# Patient Record
Sex: MALE | Race: BLACK OR AFRICAN AMERICAN | Employment: OTHER | URBAN - METROPOLITAN AREA
[De-identification: names, ages, dates, MRNs, and addresses within clinical notes are randomized per-mention and may not be internally consistent; named-entity substitution may affect disease eponyms.]

---

## 2022-09-24 ENCOUNTER — APPOINTMENT (OUTPATIENT)
Dept: GENERAL RADIOLOGY | Age: 62
DRG: 085 | End: 2022-09-24
Payer: MEDICARE

## 2022-09-24 ENCOUNTER — APPOINTMENT (OUTPATIENT)
Dept: CT IMAGING | Age: 62
DRG: 085 | End: 2022-09-24
Payer: MEDICARE

## 2022-09-24 ENCOUNTER — HOSPITAL ENCOUNTER (INPATIENT)
Age: 62
LOS: 4 days | Discharge: HOME | DRG: 085 | End: 2022-09-28
Attending: EMERGENCY MEDICINE | Admitting: INTERNAL MEDICINE
Payer: MEDICARE

## 2022-09-24 ENCOUNTER — APPOINTMENT (OUTPATIENT)
Dept: ULTRASOUND IMAGING | Age: 62
DRG: 085 | End: 2022-09-24
Payer: MEDICARE

## 2022-09-24 ENCOUNTER — HOSPITAL ENCOUNTER (EMERGENCY)
Age: 62
Discharge: ANOTHER HOSPITAL | DRG: 085 | End: 2022-09-24
Attending: EMERGENCY MEDICINE
Payer: MEDICARE

## 2022-09-24 VITALS
TEMPERATURE: 98.7 F | SYSTOLIC BLOOD PRESSURE: 161 MMHG | DIASTOLIC BLOOD PRESSURE: 107 MMHG | OXYGEN SATURATION: 100 % | RESPIRATION RATE: 16 BRPM | WEIGHT: 120 LBS | BODY MASS INDEX: 14.92 KG/M2 | HEART RATE: 104 BPM | HEIGHT: 75 IN

## 2022-09-24 DIAGNOSIS — N18.9 ACUTE KIDNEY INJURY SUPERIMPOSED ON CKD (HCC): ICD-10-CM
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
## 2022-09-24 DIAGNOSIS — N17.9 ACUTE KIDNEY INJURY SUPERIMPOSED ON CKD (HCC): ICD-10-CM

## 2022-09-24 DIAGNOSIS — S02.85XA CLOSED FRACTURE OF ORBITAL WALL, INITIAL ENCOUNTER (HCC): Primary | ICD-10-CM

## 2022-09-24 PROBLEM — N13.4 HYDROURETER, LEFT: Status: ACTIVE | Noted: 2022-09-24

## 2022-09-24 PROBLEM — G93.41 ACUTE METABOLIC ENCEPHALOPATHY: Status: ACTIVE | Noted: 2022-09-24

## 2022-09-24 PROBLEM — Z85.528 HISTORY OF RENAL CELL CANCER: Status: ACTIVE | Noted: 2022-09-24

## 2022-09-24 PROBLEM — F19.10 POLYSUBSTANCE ABUSE (HCC): Status: ACTIVE | Noted: 2022-09-24

## 2022-09-24 PROBLEM — E87.1 HYPONATREMIA: Status: ACTIVE | Noted: 2022-09-24

## 2022-09-24 PROBLEM — N13.30 HYDRONEPHROSIS: Status: ACTIVE | Noted: 2022-09-24

## 2022-09-24 PROBLEM — S02.40EA CLOSED FRACTURE OF RIGHT ZYGOMATIC ARCH (HCC): Status: ACTIVE | Noted: 2022-09-24

## 2022-09-24 PROBLEM — I16.1 HYPERTENSIVE EMERGENCY: Status: ACTIVE | Noted: 2022-09-24

## 2022-09-24 PROBLEM — Z90.5 S/P NEPHRECTOMY: Status: ACTIVE | Noted: 2022-09-24

## 2022-09-24 LAB
ABSOLUTE EOS #: 0.09 K/UL (ref 0–0.44)
ABSOLUTE EOS #: <0.03 K/UL (ref 0–0.44)
ABSOLUTE IMMATURE GRANULOCYTE: 0.03 K/UL (ref 0–0.3)
ABSOLUTE IMMATURE GRANULOCYTE: 0.05 K/UL (ref 0–0.3)
ABSOLUTE LYMPH #: 1.04 K/UL (ref 1.1–3.7)
ABSOLUTE LYMPH #: 1.16 K/UL (ref 1.1–3.7)
ABSOLUTE MONO #: 0.72 K/UL (ref 0.1–1.2)
ABSOLUTE MONO #: 1.12 K/UL (ref 0.1–1.2)
ACETAMINOPHEN LEVEL: <5 UG/ML (ref 10–30)
ALBUMIN SERPL-MCNC: 4.5 G/DL (ref 3.5–5.2)
ALBUMIN SERPL-MCNC: 4.5 G/DL (ref 3.5–5.2)
ALBUMIN/GLOBULIN RATIO: 1.6 (ref 1–2.5)
ALP BLD-CCNC: 66 U/L (ref 40–129)
ALP BLD-CCNC: 67 U/L (ref 40–129)
ALT SERPL-CCNC: 11 U/L (ref 5–41)
ALT SERPL-CCNC: 15 U/L (ref 5–41)
AMPHETAMINE SCREEN URINE: POSITIVE
ANION GAP SERPL CALCULATED.3IONS-SCNC: 13 MMOL/L (ref 9–17)
ANION GAP SERPL CALCULATED.3IONS-SCNC: 14 MMOL/L (ref 9–17)
ANION GAP SERPL CALCULATED.3IONS-SCNC: 17 MMOL/L (ref 9–17)
AST SERPL-CCNC: 27 U/L
AST SERPL-CCNC: 32 U/L
BARBITURATE SCREEN URINE: NEGATIVE
BASOPHILS # BLD: 0 % (ref 0–2)
BASOPHILS # BLD: 0 % (ref 0–2)
BASOPHILS ABSOLUTE: 0.03 K/UL (ref 0–0.2)
BASOPHILS ABSOLUTE: 0.03 K/UL (ref 0–0.2)
BENZODIAZEPINE SCREEN, URINE: NEGATIVE
BILIRUB SERPL-MCNC: 0.3 MG/DL (ref 0.3–1.2)
BILIRUB SERPL-MCNC: 0.7 MG/DL (ref 0.3–1.2)
BILIRUBIN URINE: NEGATIVE
BUN BLDV-MCNC: 30 MG/DL (ref 8–23)
BUN BLDV-MCNC: 30 MG/DL (ref 8–23)
BUN BLDV-MCNC: 33 MG/DL (ref 8–23)
BUN/CREAT BLD: 10 (ref 9–20)
CALCIUM SERPL-MCNC: 9.2 MG/DL (ref 8.6–10.4)
CALCIUM SERPL-MCNC: 9.3 MG/DL (ref 8.6–10.4)
CALCIUM SERPL-MCNC: 9.6 MG/DL (ref 8.6–10.4)
CANNABINOID SCREEN URINE: POSITIVE
CHLORIDE BLD-SCNC: 103 MMOL/L (ref 98–107)
CHLORIDE BLD-SCNC: 94 MMOL/L (ref 98–107)
CHLORIDE BLD-SCNC: 97 MMOL/L (ref 98–107)
CO2: 19 MMOL/L (ref 20–31)
CO2: 21 MMOL/L (ref 20–31)
CO2: 22 MMOL/L (ref 20–31)
COCAINE METABOLITE, URINE: NEGATIVE
COLOR: YELLOW
CREAT SERPL-MCNC: 2.71 MG/DL (ref 0.7–1.2)
CREAT SERPL-MCNC: 2.73 MG/DL (ref 0.7–1.2)
CREAT SERPL-MCNC: 3.18 MG/DL (ref 0.7–1.2)
CREATININE URINE: 58.3 MG/DL (ref 39–259)
EOSINOPHILS RELATIVE PERCENT: 0 % (ref 1–4)
EOSINOPHILS RELATIVE PERCENT: 1 % (ref 1–4)
EPITHELIAL CELLS UA: NORMAL /HPF (ref 0–5)
ETHANOL PERCENT: 0.13 %
ETHANOL PERCENT: <0.01 %
ETHANOL: 132 MG/DL
ETHANOL: <10 MG/DL
FENTANYL URINE: NEGATIVE
GFR AFRICAN AMERICAN: 24 ML/MIN
GFR AFRICAN AMERICAN: 29 ML/MIN
GFR AFRICAN AMERICAN: 29 ML/MIN
GFR NON-AFRICAN AMERICAN: 20 ML/MIN
GFR NON-AFRICAN AMERICAN: 24 ML/MIN
GFR NON-AFRICAN AMERICAN: 24 ML/MIN
GFR SERPL CREATININE-BSD FRML MDRD: ABNORMAL ML/MIN/{1.73_M2}
GLUCOSE BLD-MCNC: 104 MG/DL (ref 70–99)
GLUCOSE BLD-MCNC: 96 MG/DL (ref 70–99)
GLUCOSE BLD-MCNC: 98 MG/DL (ref 70–99)
GLUCOSE URINE: NEGATIVE
HCT VFR BLD CALC: 30.4 % (ref 40.7–50.3)
HCT VFR BLD CALC: 33.5 % (ref 40.7–50.3)
HEMOGLOBIN: 11 G/DL (ref 13–17)
HEMOGLOBIN: 11.2 G/DL (ref 13–17)
IMMATURE GRANULOCYTES: 0 %
IMMATURE GRANULOCYTES: 0 %
KETONES, URINE: NEGATIVE
LEUKOCYTE ESTERASE, URINE: NEGATIVE
LYMPHOCYTES # BLD: 15 % (ref 24–43)
LYMPHOCYTES # BLD: 9 % (ref 24–43)
MCH RBC QN AUTO: 32.9 PG (ref 25.2–33.5)
MCH RBC QN AUTO: 34.5 PG (ref 25.2–33.5)
MCHC RBC AUTO-ENTMCNC: 33.4 G/DL (ref 28.4–34.8)
MCHC RBC AUTO-ENTMCNC: 36.2 G/DL (ref 28.4–34.8)
MCV RBC AUTO: 95.3 FL (ref 82.6–102.9)
MCV RBC AUTO: 98.5 FL (ref 82.6–102.9)
METHADONE SCREEN, URINE: NEGATIVE
MONOCYTES # BLD: 9 % (ref 3–12)
MONOCYTES # BLD: 9 % (ref 3–12)
NITRITE, URINE: NEGATIVE
NRBC AUTOMATED: 0 PER 100 WBC
NRBC AUTOMATED: 0 PER 100 WBC
OPIATES, URINE: NEGATIVE
OSMOLALITY URINE: 224 MOSM/KG (ref 80–1300)
OXYCODONE SCREEN URINE: NEGATIVE
PDW BLD-RTO: 11.8 % (ref 11.8–14.4)
PDW BLD-RTO: 11.9 % (ref 11.8–14.4)
PH UA: 5 (ref 5–8)
PHENCYCLIDINE, URINE: NEGATIVE
PLATELET # BLD: 292 K/UL (ref 138–453)
PLATELET # BLD: 303 K/UL (ref 138–453)
PMV BLD AUTO: 9 FL (ref 8.1–13.5)
PMV BLD AUTO: 9.2 FL (ref 8.1–13.5)
POTASSIUM SERPL-SCNC: 4.7 MMOL/L (ref 3.7–5.3)
POTASSIUM SERPL-SCNC: 4.9 MMOL/L (ref 3.7–5.3)
POTASSIUM SERPL-SCNC: 5.4 MMOL/L (ref 3.7–5.3)
PROTEIN UA: ABNORMAL
RBC # BLD: 3.19 M/UL (ref 4.21–5.77)
RBC # BLD: 3.4 M/UL (ref 4.21–5.77)
RBC UA: NORMAL /HPF (ref 0–4)
SALICYLATE LEVEL: <1 MG/DL (ref 3–10)
SEG NEUTROPHILS: 75 % (ref 36–65)
SEG NEUTROPHILS: 82 % (ref 36–65)
SEGMENTED NEUTROPHILS ABSOLUTE COUNT: 5.97 K/UL (ref 1.5–8.1)
SEGMENTED NEUTROPHILS ABSOLUTE COUNT: 9.96 K/UL (ref 1.5–8.1)
SERUM OSMOLALITY: 300 MOSM/KG (ref 275–295)
SODIUM BLD-SCNC: 130 MMOL/L (ref 135–144)
SODIUM BLD-SCNC: 133 MMOL/L (ref 135–144)
SODIUM BLD-SCNC: 137 MMOL/L (ref 135–144)
SODIUM,UR: 46 MMOL/L
SPECIFIC GRAVITY UA: 1.01 (ref 1–1.03)
TEST INFORMATION: ABNORMAL
TOTAL CK: 805 U/L (ref 39–308)
TOTAL PROTEIN: 7.3 G/DL (ref 6.4–8.3)
TOTAL PROTEIN: 7.7 G/DL (ref 6.4–8.3)
TOXIC TRICYCLIC SC,BLOOD: NEGATIVE
TURBIDITY: CLEAR
URINE HGB: ABNORMAL
UROBILINOGEN, URINE: NORMAL
WBC # BLD: 12.2 K/UL (ref 3.5–11.3)
WBC # BLD: 8 K/UL (ref 3.5–11.3)
WBC UA: NORMAL /HPF (ref 0–5)

## 2022-09-24 PROCEDURE — 81001 URINALYSIS AUTO W/SCOPE: CPT

## 2022-09-24 PROCEDURE — 6370000000 HC RX 637 (ALT 250 FOR IP)

## 2022-09-24 PROCEDURE — 84300 ASSAY OF URINE SODIUM: CPT

## 2022-09-24 PROCEDURE — 80143 DRUG ASSAY ACETAMINOPHEN: CPT

## 2022-09-24 PROCEDURE — G0480 DRUG TEST DEF 1-7 CLASSES: HCPCS

## 2022-09-24 PROCEDURE — 70486 CT MAXILLOFACIAL W/O DYE: CPT

## 2022-09-24 PROCEDURE — 99285 EMERGENCY DEPT VISIT HI MDM: CPT

## 2022-09-24 PROCEDURE — 82570 ASSAY OF URINE CREATININE: CPT

## 2022-09-24 PROCEDURE — 96375 TX/PRO/DX INJ NEW DRUG ADDON: CPT

## 2022-09-24 PROCEDURE — 0HQ1XZZ REPAIR FACE SKIN, EXTERNAL APPROACH: ICD-10-PCS | Performed by: STUDENT IN AN ORGANIZED HEALTH CARE EDUCATION/TRAINING PROGRAM

## 2022-09-24 PROCEDURE — 82550 ASSAY OF CK (CPK): CPT

## 2022-09-24 PROCEDURE — 74176 CT ABD & PELVIS W/O CONTRAST: CPT

## 2022-09-24 PROCEDURE — 6370000000 HC RX 637 (ALT 250 FOR IP): Performed by: STUDENT IN AN ORGANIZED HEALTH CARE EDUCATION/TRAINING PROGRAM

## 2022-09-24 PROCEDURE — 6360000002 HC RX W HCPCS: Performed by: STUDENT IN AN ORGANIZED HEALTH CARE EDUCATION/TRAINING PROGRAM

## 2022-09-24 PROCEDURE — 90715 TDAP VACCINE 7 YRS/> IM: CPT | Performed by: EMERGENCY MEDICINE

## 2022-09-24 PROCEDURE — 76770 US EXAM ABDO BACK WALL COMP: CPT

## 2022-09-24 PROCEDURE — 36415 COLL VENOUS BLD VENIPUNCTURE: CPT

## 2022-09-24 PROCEDURE — 70450 CT HEAD/BRAIN W/O DYE: CPT

## 2022-09-24 PROCEDURE — 2580000003 HC RX 258: Performed by: STUDENT IN AN ORGANIZED HEALTH CARE EDUCATION/TRAINING PROGRAM

## 2022-09-24 PROCEDURE — 96374 THER/PROPH/DIAG INJ IV PUSH: CPT

## 2022-09-24 PROCEDURE — 73080 X-RAY EXAM OF ELBOW: CPT

## 2022-09-24 PROCEDURE — 80053 COMPREHEN METABOLIC PANEL: CPT

## 2022-09-24 PROCEDURE — 80307 DRUG TEST PRSMV CHEM ANLYZR: CPT

## 2022-09-24 PROCEDURE — 90471 IMMUNIZATION ADMIN: CPT | Performed by: EMERGENCY MEDICINE

## 2022-09-24 PROCEDURE — 6360000002 HC RX W HCPCS: Performed by: EMERGENCY MEDICINE

## 2022-09-24 PROCEDURE — 85025 COMPLETE CBC W/AUTO DIFF WBC: CPT

## 2022-09-24 PROCEDURE — 2060000000 HC ICU INTERMEDIATE R&B

## 2022-09-24 PROCEDURE — 71045 X-RAY EXAM CHEST 1 VIEW: CPT

## 2022-09-24 PROCEDURE — 6360000002 HC RX W HCPCS

## 2022-09-24 PROCEDURE — 83935 ASSAY OF URINE OSMOLALITY: CPT

## 2022-09-24 PROCEDURE — 80179 DRUG ASSAY SALICYLATE: CPT

## 2022-09-24 PROCEDURE — 83930 ASSAY OF BLOOD OSMOLALITY: CPT

## 2022-09-24 PROCEDURE — 80048 BASIC METABOLIC PNL TOTAL CA: CPT

## 2022-09-24 PROCEDURE — 72125 CT NECK SPINE W/O DYE: CPT

## 2022-09-24 PROCEDURE — 2580000003 HC RX 258

## 2022-09-24 RX ORDER — SODIUM CHLORIDE 9 MG/ML
INJECTION, SOLUTION INTRAVENOUS CONTINUOUS
Status: DISCONTINUED | OUTPATIENT
Start: 2022-09-24 | End: 2022-09-27

## 2022-09-24 RX ORDER — TAMSULOSIN HYDROCHLORIDE 0.4 MG/1
0.4 CAPSULE ORAL DAILY
Status: DISCONTINUED | OUTPATIENT
Start: 2022-09-24 | End: 2022-09-28 | Stop reason: HOSPADM

## 2022-09-24 RX ORDER — POLYETHYLENE GLYCOL 3350 17 G/17G
17 POWDER, FOR SOLUTION ORAL DAILY PRN
Status: DISCONTINUED | OUTPATIENT
Start: 2022-09-24 | End: 2022-09-28 | Stop reason: HOSPADM

## 2022-09-24 RX ORDER — LABETALOL 100 MG/1
100 TABLET, FILM COATED ORAL 2 TIMES DAILY
Status: DISCONTINUED | OUTPATIENT
Start: 2022-09-24 | End: 2022-09-25

## 2022-09-24 RX ORDER — FENTANYL CITRATE 50 UG/ML
50 INJECTION, SOLUTION INTRAMUSCULAR; INTRAVENOUS ONCE
Status: COMPLETED | OUTPATIENT
Start: 2022-09-24 | End: 2022-09-24

## 2022-09-24 RX ORDER — SODIUM CHLORIDE, SODIUM LACTATE, POTASSIUM CHLORIDE, AND CALCIUM CHLORIDE .6; .31; .03; .02 G/100ML; G/100ML; G/100ML; G/100ML
1000 INJECTION, SOLUTION INTRAVENOUS ONCE
Status: COMPLETED | OUTPATIENT
Start: 2022-09-24 | End: 2022-09-24

## 2022-09-24 RX ORDER — SODIUM CHLORIDE 0.9 % (FLUSH) 0.9 %
5-40 SYRINGE (ML) INJECTION EVERY 12 HOURS SCHEDULED
Status: DISCONTINUED | OUTPATIENT
Start: 2022-09-24 | End: 2022-09-28 | Stop reason: HOSPADM

## 2022-09-24 RX ORDER — SODIUM CHLORIDE 0.9 % (FLUSH) 0.9 %
5-40 SYRINGE (ML) INJECTION PRN
Status: DISCONTINUED | OUTPATIENT
Start: 2022-09-24 | End: 2022-09-28 | Stop reason: HOSPADM

## 2022-09-24 RX ORDER — LISINOPRIL 10 MG/1
10 TABLET ORAL DAILY
Status: ON HOLD | COMMUNITY
End: 2022-09-28 | Stop reason: HOSPADM

## 2022-09-24 RX ORDER — ONDANSETRON 2 MG/ML
4 INJECTION INTRAMUSCULAR; INTRAVENOUS ONCE
Status: COMPLETED | OUTPATIENT
Start: 2022-09-24 | End: 2022-09-24

## 2022-09-24 RX ORDER — ONDANSETRON 2 MG/ML
4 INJECTION INTRAMUSCULAR; INTRAVENOUS EVERY 6 HOURS PRN
Status: DISCONTINUED | OUTPATIENT
Start: 2022-09-24 | End: 2022-09-28 | Stop reason: HOSPADM

## 2022-09-24 RX ORDER — AMLODIPINE BESYLATE 10 MG/1
10 TABLET ORAL DAILY
Status: DISCONTINUED | OUTPATIENT
Start: 2022-09-24 | End: 2022-09-24

## 2022-09-24 RX ORDER — LIDOCAINE HYDROCHLORIDE 20 MG/ML
JELLY TOPICAL
Status: COMPLETED | OUTPATIENT
Start: 2022-09-24 | End: 2022-09-24

## 2022-09-24 RX ORDER — AMLODIPINE BESYLATE 10 MG/1
10 TABLET ORAL DAILY
Status: DISCONTINUED | OUTPATIENT
Start: 2022-09-24 | End: 2022-09-28 | Stop reason: HOSPADM

## 2022-09-24 RX ORDER — ONDANSETRON 4 MG/1
4 TABLET, ORALLY DISINTEGRATING ORAL EVERY 8 HOURS PRN
Status: DISCONTINUED | OUTPATIENT
Start: 2022-09-24 | End: 2022-09-28 | Stop reason: HOSPADM

## 2022-09-24 RX ORDER — LABETALOL 100 MG/1
100 TABLET, FILM COATED ORAL ONCE
Status: DISCONTINUED | OUTPATIENT
Start: 2022-09-24 | End: 2022-09-26

## 2022-09-24 RX ORDER — SODIUM CHLORIDE 9 MG/ML
INJECTION, SOLUTION INTRAVENOUS PRN
Status: DISCONTINUED | OUTPATIENT
Start: 2022-09-24 | End: 2022-09-28 | Stop reason: HOSPADM

## 2022-09-24 RX ORDER — AMLODIPINE BESYLATE 10 MG/1
TABLET ORAL DAILY
COMMUNITY

## 2022-09-24 RX ORDER — HYDROCHLOROTHIAZIDE 12.5 MG/1
12.5 CAPSULE, GELATIN COATED ORAL DAILY
Status: ON HOLD | COMMUNITY
End: 2022-09-28 | Stop reason: HOSPADM

## 2022-09-24 RX ORDER — ONDANSETRON 2 MG/ML
4 INJECTION INTRAMUSCULAR; INTRAVENOUS EVERY 6 HOURS PRN
Status: DISCONTINUED | OUTPATIENT
Start: 2022-09-24 | End: 2022-09-24

## 2022-09-24 RX ORDER — HYDRALAZINE HYDROCHLORIDE 50 MG/1
100 TABLET, FILM COATED ORAL 2 TIMES DAILY
Status: ON HOLD | COMMUNITY
End: 2022-09-28 | Stop reason: HOSPADM

## 2022-09-24 RX ORDER — LABETALOL 100 MG/1
200 TABLET, FILM COATED ORAL 2 TIMES DAILY
Status: ON HOLD | COMMUNITY
End: 2022-09-28 | Stop reason: SDUPTHER

## 2022-09-24 RX ORDER — AMLODIPINE BESYLATE 10 MG/1
10 TABLET ORAL ONCE
Status: COMPLETED | OUTPATIENT
Start: 2022-09-24 | End: 2022-09-24

## 2022-09-24 RX ORDER — LISINOPRIL 10 MG/1
10 TABLET ORAL ONCE
Status: COMPLETED | OUTPATIENT
Start: 2022-09-24 | End: 2022-09-24

## 2022-09-24 RX ORDER — ACETAMINOPHEN 650 MG/1
650 SUPPOSITORY RECTAL EVERY 6 HOURS PRN
Status: DISCONTINUED | OUTPATIENT
Start: 2022-09-24 | End: 2022-09-28 | Stop reason: HOSPADM

## 2022-09-24 RX ORDER — LISINOPRIL 10 MG/1
10 TABLET ORAL DAILY
Status: DISCONTINUED | OUTPATIENT
Start: 2022-09-24 | End: 2022-09-24

## 2022-09-24 RX ORDER — CEPHALEXIN 500 MG/1
500 CAPSULE ORAL ONCE
Status: COMPLETED | OUTPATIENT
Start: 2022-09-24 | End: 2022-09-24

## 2022-09-24 RX ORDER — LABETALOL HYDROCHLORIDE 5 MG/ML
10 INJECTION, SOLUTION INTRAVENOUS EVERY 6 HOURS PRN
Status: DISCONTINUED | OUTPATIENT
Start: 2022-09-24 | End: 2022-09-28

## 2022-09-24 RX ORDER — ACETAMINOPHEN 325 MG/1
650 TABLET ORAL EVERY 6 HOURS PRN
Status: DISCONTINUED | OUTPATIENT
Start: 2022-09-24 | End: 2022-09-28 | Stop reason: HOSPADM

## 2022-09-24 RX ORDER — HEPARIN SODIUM 5000 [USP'U]/ML
5000 INJECTION, SOLUTION INTRAVENOUS; SUBCUTANEOUS EVERY 8 HOURS SCHEDULED
Status: DISCONTINUED | OUTPATIENT
Start: 2022-09-24 | End: 2022-09-28 | Stop reason: HOSPADM

## 2022-09-24 RX ADMIN — SODIUM CHLORIDE: 9 INJECTION, SOLUTION INTRAVENOUS at 17:57

## 2022-09-24 RX ADMIN — LIDOCAINE HYDROCHLORIDE: 20 JELLY TOPICAL at 20:58

## 2022-09-24 RX ADMIN — SODIUM CHLORIDE, POTASSIUM CHLORIDE, SODIUM LACTATE AND CALCIUM CHLORIDE 1000 ML: 600; 310; 30; 20 INJECTION, SOLUTION INTRAVENOUS at 11:08

## 2022-09-24 RX ADMIN — ONDANSETRON 4 MG: 2 INJECTION INTRAMUSCULAR; INTRAVENOUS at 10:59

## 2022-09-24 RX ADMIN — LISINOPRIL 10 MG: 10 TABLET ORAL at 11:44

## 2022-09-24 RX ADMIN — TAMSULOSIN HYDROCHLORIDE 0.4 MG: 0.4 CAPSULE ORAL at 21:43

## 2022-09-24 RX ADMIN — LABETALOL HYDROCHLORIDE 100 MG: 100 TABLET, FILM COATED ORAL at 21:43

## 2022-09-24 RX ADMIN — CEPHALEXIN 500 MG: 500 CAPSULE ORAL at 14:33

## 2022-09-24 RX ADMIN — HEPARIN SODIUM 5000 UNITS: 5000 INJECTION INTRAVENOUS; SUBCUTANEOUS at 21:43

## 2022-09-24 RX ADMIN — TETANUS TOXOID, REDUCED DIPHTHERIA TOXOID AND ACELLULAR PERTUSSIS VACCINE, ADSORBED 0.5 ML: 5; 2.5; 8; 8; 2.5 SUSPENSION INTRAMUSCULAR at 05:35

## 2022-09-24 RX ADMIN — AMLODIPINE BESYLATE 10 MG: 10 TABLET ORAL at 11:44

## 2022-09-24 RX ADMIN — FENTANYL CITRATE 50 MCG: 50 INJECTION, SOLUTION INTRAMUSCULAR; INTRAVENOUS at 09:04

## 2022-09-24 ASSESSMENT — ENCOUNTER SYMPTOMS
VOMITING: 1
VOMITING: 0
SHORTNESS OF BREATH: 0
EYE PAIN: 1
SINUS PAIN: 0
COUGH: 0
DIARRHEA: 0
CHEST TIGHTNESS: 0
CONSTIPATION: 0
EYES NEGATIVE: 1
FACIAL SWELLING: 1
NAUSEA: 1
ABDOMINAL PAIN: 0
COLOR CHANGE: 0
APNEA: 0
SHORTNESS OF BREATH: 0
ABDOMINAL DISTENTION: 0

## 2022-09-24 ASSESSMENT — PAIN - FUNCTIONAL ASSESSMENT
PAIN_FUNCTIONAL_ASSESSMENT: 0-10
PAIN_FUNCTIONAL_ASSESSMENT: 0-10

## 2022-09-24 ASSESSMENT — VISUAL ACUITY
OS: 20/20
OU: 20/25
OD: 20/200

## 2022-09-24 ASSESSMENT — PAIN SCALES - GENERAL
PAINLEVEL_OUTOF10: 10
PAINLEVEL_OUTOF10: 8
PAINLEVEL_OUTOF10: 8

## 2022-09-24 ASSESSMENT — PAIN DESCRIPTION - PAIN TYPE: TYPE: CHRONIC PAIN
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
## 2022-09-24 NOTE — ED NOTES
The following labs were labeled with appropriate pt sticker and tubed to lab:     [] Blue     [x] Lavender   [] on ice  [x] Green/yellow  [] Green/black [] on ice  [] [place]  [] on ice  [] Yellow  [] Red  [] Pink  [] ABG  [] VBG    [] COVID-19 swab    [] Rapid  [] PCR  [] Flu swab  [] Peds Viral Panel     [] Urine Sample  [] Pelvic Cultures  [] Blood Cultures  [] X 2  [] STREP Cultures       Ene Hassan RN  09/24/22 9662
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
## 2022-09-24 NOTE — ED NOTES
Gera pen provided to resident.  Dr. Herve Looney with trauma at bedside to evaluate pt     Vy Self, BRISA  09/24/22 0680

## 2022-09-24 NOTE — ED NOTES
Spoke with dr. Bo Mendoza via perfect serve and verified the solomon catheter order because the pt is voiding well on his own. Dr. Jerome Whitfield said to hold the [facility] at this time and informthe floor nurse to get a PVR on him.        Cassie Ramirez RN  09/24/22 9357

## 2022-09-24 NOTE — ED NOTES
Admitting team at [address] [place] [address], [facility]  09/24/22 9384
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
## 2022-09-24 NOTE — ED NOTES
Patient's guest approached writer & asked writer to contact [facility] to reconcile patient's med list. Writer completed, left list on RN's desk.       NANCY Silva  09/24/22 1129

## 2022-09-25 PROBLEM — R93.3 ABNORMAL FINDING ON GI TRACT IMAGING: Status: ACTIVE | Noted: 2022-09-25

## 2022-09-25 PROBLEM — K86.3 PANCREATIC PSEUDOCYST: Status: ACTIVE | Noted: 2022-09-25

## 2022-09-25 PROBLEM — N32.3 BLADDER DIVERTICULUM: Status: ACTIVE | Noted: 2022-09-25

## 2022-09-25 LAB
ABSOLUTE EOS #: 0.04 K/UL (ref 0–0.44)
ABSOLUTE IMMATURE GRANULOCYTE: <0.03 K/UL (ref 0–0.3)
ABSOLUTE LYMPH #: 1.29 K/UL (ref 1.1–3.7)
ABSOLUTE MONO #: 1.21 K/UL (ref 0.1–1.2)
ANION GAP SERPL CALCULATED.3IONS-SCNC: 14 MMOL/L (ref 9–17)
BASOPHILS # BLD: 0 % (ref 0–2)
BASOPHILS ABSOLUTE: <0.03 K/UL (ref 0–0.2)
BUN BLDV-MCNC: 28 MG/DL (ref 8–23)
CALCIUM SERPL-MCNC: 8.8 MG/DL (ref 8.6–10.4)
CHLORIDE BLD-SCNC: 104 MMOL/L (ref 98–107)
CHLORIDE, UR: 66 MMOL/L
CO2: 20 MMOL/L (ref 20–31)
COMPLEMENT C3: 102 MG/DL (ref 90–180)
COMPLEMENT C4: 21 MG/DL (ref 10–40)
CREAT SERPL-MCNC: 2.66 MG/DL (ref 0.7–1.2)
CREATININE URINE: 111.3 MG/DL (ref 39–259)
EOSINOPHIL,URINE: NORMAL
EOSINOPHILS RELATIVE PERCENT: 1 % (ref 1–4)
FREE KAPPA/LAMBDA RATIO: 1.45 (ref 0.26–1.65)
GFR AFRICAN AMERICAN: 30 ML/MIN
GFR NON-AFRICAN AMERICAN: 25 ML/MIN
GFR SERPL CREATININE-BSD FRML MDRD: ABNORMAL ML/MIN/{1.73_M2}
GLUCOSE BLD-MCNC: 88 MG/DL (ref 70–99)
HAV IGM SER IA-ACNC: NONREACTIVE
HCT VFR BLD CALC: 29.8 % (ref 40.7–50.3)
HEMOGLOBIN: 10.1 G/DL (ref 13–17)
HEPATITIS B CORE IGM ANTIBODY: NONREACTIVE
HEPATITIS B SURFACE ANTIGEN: NONREACTIVE
HEPATITIS C ANTIBODY: NONREACTIVE
IMMATURE GRANULOCYTES: 0 %
KAPPA FREE LIGHT CHAINS QNT: 3.95 MG/DL (ref 0.37–1.94)
LAMBDA FREE LIGHT CHAINS QNT: 2.72 MG/DL (ref 0.57–2.63)
LYMPHOCYTES # BLD: 18 % (ref 24–43)
MCH RBC QN AUTO: 33 PG (ref 25.2–33.5)
MCHC RBC AUTO-ENTMCNC: 33.9 G/DL (ref 28.4–34.8)
MCV RBC AUTO: 97.4 FL (ref 82.6–102.9)
MONOCYTES # BLD: 17 % (ref 3–12)
NRBC AUTOMATED: 0 PER 100 WBC
PDW BLD-RTO: 11.9 % (ref 11.8–14.4)
PLATELET # BLD: 262 K/UL (ref 138–453)
PMV BLD AUTO: 9.4 FL (ref 8.1–13.5)
POTASSIUM SERPL-SCNC: 4.6 MMOL/L (ref 3.7–5.3)
RBC # BLD: 3.06 M/UL (ref 4.21–5.77)
SEG NEUTROPHILS: 65 % (ref 36–65)
SEGMENTED NEUTROPHILS ABSOLUTE COUNT: 4.77 K/UL (ref 1.5–8.1)
SODIUM BLD-SCNC: 138 MMOL/L (ref 135–144)
SODIUM,UR: 73 MMOL/L
TOTAL PROTEIN, URINE: 40 MG/DL
URINE TOTAL PROTEIN CREATININE RATIO: 0.36 (ref 0–0.2)
WBC # BLD: 7.4 K/UL (ref 3.5–11.3)

## 2022-09-25 PROCEDURE — 2580000003 HC RX 258

## 2022-09-25 PROCEDURE — 99222 1ST HOSP IP/OBS MODERATE 55: CPT | Performed by: INTERNAL MEDICINE

## 2022-09-25 PROCEDURE — 99223 1ST HOSP IP/OBS HIGH 75: CPT | Performed by: INTERNAL MEDICINE

## 2022-09-25 PROCEDURE — 80074 ACUTE HEPATITIS PANEL: CPT

## 2022-09-25 PROCEDURE — 86334 IMMUNOFIX E-PHORESIS SERUM: CPT

## 2022-09-25 PROCEDURE — 84165 PROTEIN E-PHORESIS SERUM: CPT

## 2022-09-25 PROCEDURE — 6370000000 HC RX 637 (ALT 250 FOR IP)

## 2022-09-25 PROCEDURE — 2500000003 HC RX 250 WO HCPCS

## 2022-09-25 PROCEDURE — 84156 ASSAY OF PROTEIN URINE: CPT

## 2022-09-25 PROCEDURE — 6370000000 HC RX 637 (ALT 250 FOR IP): Performed by: STUDENT IN AN ORGANIZED HEALTH CARE EDUCATION/TRAINING PROGRAM

## 2022-09-25 PROCEDURE — 86225 DNA ANTIBODY NATIVE: CPT

## 2022-09-25 PROCEDURE — 97535 SELF CARE MNGMENT TRAINING: CPT

## 2022-09-25 PROCEDURE — 84166 PROTEIN E-PHORESIS/URINE/CSF: CPT

## 2022-09-25 PROCEDURE — 86335 IMMUNFIX E-PHORSIS/URINE/CSF: CPT

## 2022-09-25 PROCEDURE — 80048 BASIC METABOLIC PNL TOTAL CA: CPT

## 2022-09-25 PROCEDURE — 6360000002 HC RX W HCPCS

## 2022-09-25 PROCEDURE — 83521 IG LIGHT CHAINS FREE EACH: CPT

## 2022-09-25 PROCEDURE — 97166 OT EVAL MOD COMPLEX 45 MIN: CPT

## 2022-09-25 PROCEDURE — 84155 ASSAY OF PROTEIN SERUM: CPT

## 2022-09-25 PROCEDURE — 2060000000 HC ICU INTERMEDIATE R&B

## 2022-09-25 PROCEDURE — 97116 GAIT TRAINING THERAPY: CPT

## 2022-09-25 PROCEDURE — 86038 ANTINUCLEAR ANTIBODIES: CPT

## 2022-09-25 PROCEDURE — 82570 ASSAY OF URINE CREATININE: CPT

## 2022-09-25 PROCEDURE — 86160 COMPLEMENT ANTIGEN: CPT

## 2022-09-25 PROCEDURE — 82436 ASSAY OF URINE CHLORIDE: CPT

## 2022-09-25 PROCEDURE — 87205 SMEAR GRAM STAIN: CPT

## 2022-09-25 PROCEDURE — 85025 COMPLETE CBC W/AUTO DIFF WBC: CPT

## 2022-09-25 PROCEDURE — 36415 COLL VENOUS BLD VENIPUNCTURE: CPT

## 2022-09-25 PROCEDURE — 97161 PT EVAL LOW COMPLEX 20 MIN: CPT

## 2022-09-25 PROCEDURE — 97530 THERAPEUTIC ACTIVITIES: CPT

## 2022-09-25 PROCEDURE — 84300 ASSAY OF URINE SODIUM: CPT

## 2022-09-25 PROCEDURE — 99221 1ST HOSP IP/OBS SF/LOW 40: CPT | Performed by: INTERNAL MEDICINE

## 2022-09-25 RX ORDER — LABETALOL 200 MG/1
200 TABLET, FILM COATED ORAL 2 TIMES DAILY
Status: DISCONTINUED | OUTPATIENT
Start: 2022-09-25 | End: 2022-09-26

## 2022-09-25 RX ORDER — HYDRALAZINE HYDROCHLORIDE 50 MG/1
100 TABLET, FILM COATED ORAL EVERY 8 HOURS SCHEDULED
Status: DISCONTINUED | OUTPATIENT
Start: 2022-09-25 | End: 2022-09-28 | Stop reason: HOSPADM

## 2022-09-25 RX ORDER — NICOTINE 21 MG/24HR
1 PATCH, TRANSDERMAL 24 HOURS TRANSDERMAL DAILY
Status: DISCONTINUED | OUTPATIENT
Start: 2022-09-25 | End: 2022-09-28 | Stop reason: HOSPADM

## 2022-09-25 RX ORDER — HYDRALAZINE HYDROCHLORIDE 50 MG/1
100 TABLET, FILM COATED ORAL 2 TIMES DAILY
Status: DISCONTINUED | OUTPATIENT
Start: 2022-09-25 | End: 2022-09-25

## 2022-09-25 RX ADMIN — CEFTRIAXONE SODIUM 1000 MG: 1 INJECTION, POWDER, FOR SOLUTION INTRAMUSCULAR; INTRAVENOUS at 16:18

## 2022-09-25 RX ADMIN — HEPARIN SODIUM 5000 UNITS: 5000 INJECTION INTRAVENOUS; SUBCUTANEOUS at 06:20

## 2022-09-25 RX ADMIN — LABETALOL HYDROCHLORIDE 100 MG: 100 TABLET, FILM COATED ORAL at 08:18

## 2022-09-25 RX ADMIN — AMLODIPINE BESYLATE 10 MG: 10 TABLET ORAL at 08:17

## 2022-09-25 RX ADMIN — TAMSULOSIN HYDROCHLORIDE 0.4 MG: 0.4 CAPSULE ORAL at 08:18

## 2022-09-25 RX ADMIN — SODIUM CHLORIDE: 9 INJECTION, SOLUTION INTRAVENOUS at 04:45

## 2022-09-25 RX ADMIN — HEPARIN SODIUM 5000 UNITS: 5000 INJECTION INTRAVENOUS; SUBCUTANEOUS at 21:31

## 2022-09-25 RX ADMIN — ACETAMINOPHEN 650 MG: 325 TABLET ORAL at 08:28

## 2022-09-25 RX ADMIN — HYDRALAZINE HYDROCHLORIDE 100 MG: 50 TABLET, FILM COATED ORAL at 13:58

## 2022-09-25 RX ADMIN — HYDRALAZINE HYDROCHLORIDE 100 MG: 50 TABLET, FILM COATED ORAL at 08:17

## 2022-09-25 RX ADMIN — SODIUM CHLORIDE, PRESERVATIVE FREE 10 ML: 5 INJECTION INTRAVENOUS at 08:18

## 2022-09-25 RX ADMIN — HEPARIN SODIUM 5000 UNITS: 5000 INJECTION INTRAVENOUS; SUBCUTANEOUS at 13:58

## 2022-09-25 RX ADMIN — Medication 5 ML: at 13:58

## 2022-09-25 RX ADMIN — THIAMINE HYDROCHLORIDE: 100 INJECTION, SOLUTION INTRAMUSCULAR; INTRAVENOUS at 08:19

## 2022-09-25 RX ADMIN — PHENOL 1 SPRAY: 1.5 LIQUID ORAL at 13:58

## 2022-09-25 RX ADMIN — LABETALOL HYDROCHLORIDE 200 MG: 200 TABLET, FILM COATED ORAL at 21:30

## 2022-09-25 RX ADMIN — ACETAMINOPHEN 650 MG: 325 TABLET ORAL at 14:05

## 2022-09-25 RX ADMIN — SODIUM CHLORIDE 900 ML: 9 INJECTION, SOLUTION INTRAVENOUS at 16:16

## 2022-09-25 RX ADMIN — HYDRALAZINE HYDROCHLORIDE 100 MG: 50 TABLET, FILM COATED ORAL at 21:31

## 2022-09-25 ASSESSMENT — PAIN DESCRIPTION - LOCATION: LOCATION: FACE

## 2022-09-25 ASSESSMENT — ENCOUNTER SYMPTOMS
PHOTOPHOBIA: 1
ABDOMINAL PAIN: 0
EYE PAIN: 1
COUGH: 0
VOMITING: 1
NAUSEA: 1
EYE REDNESS: 1
BACK PAIN: 0
SHORTNESS OF BREATH: 0

## 2022-09-25 ASSESSMENT — PAIN - FUNCTIONAL ASSESSMENT: PAIN_FUNCTIONAL_ASSESSMENT: PREVENTS OR INTERFERES SOME ACTIVE ACTIVITIES AND ADLS

## 2022-09-25 ASSESSMENT — PAIN SCALES - GENERAL
PAINLEVEL_OUTOF10: 3
PAINLEVEL_OUTOF10: 2
PAINLEVEL_OUTOF10: 0

## 2022-09-25 ASSESSMENT — PAIN DESCRIPTION - ORIENTATION: ORIENTATION: RIGHT

## 2022-09-25 ASSESSMENT — PAIN DESCRIPTION - DESCRIPTORS: DESCRIPTORS: ACHING

## 2022-09-25 NOTE — PROGRESS NOTES
Yasmin Parker, Marlon, Toshia Mixer, Siomara Godoy  Urology Progress Note     Subjective:   Hypertensive overnight  Afebrile   Sleeping comfortably in room   UOP 2.885L/24 hours  Cr 2.66 (2.73)  WBC 7.4 (12.2)    Patient Vitals for the past 24 hrs:   BP Temp Temp src Pulse Resp SpO2 Weight   09/25/22 0815 (!) 159/98 98.7 °F (37.1 °C) Oral (!) 104 18 100 % --   09/25/22 0600 -- -- -- -- -- -- 145 lb 15.1 oz (66.2 kg)   09/25/22 0320 (!) 139/128 98.1 °F (36.7 °C) Axillary 95 15 98 % --   09/24/22 2340 (!) 150/82 98.6 °F (37 °C) Oral 98 20 98 % --   09/24/22 2005 (!) 142/90 99.6 °F (37.6 °C) Oral 98 17 97 % --   09/24/22 1800 (!) 122/90 -- -- (!) 104 24 97 % --   09/24/22 1759 -- -- -- 96 -- -- --   09/24/22 1517 (!) 166/125 -- -- (!) 115 -- 95 % --   09/24/22 1216 (!) 190/104 -- -- -- -- 95 % --   09/24/22 1209 (!) 179/102 -- -- -- -- 95 % --   09/24/22 1146 (!) 186/107 -- -- 99 -- 92 % --   09/24/22 1144 (!) 179/104 -- -- -- -- -- --   09/24/22 1132 (!) 179/104 -- -- 95 -- 95 % --   09/24/22 1025 (!) 186/109 -- -- 96 -- 96 % --   09/24/22 1016 (!) 200/107 -- -- 98 -- 95 % --   09/24/22 0917 (!) 170/116 -- -- 97 -- 95 % --   09/24/22 0905 (!) 189/103 -- -- 99 -- 97 % --       Intake/Output Summary (Last 24 hours) at 9/25/2022 0827  Last data filed at 9/25/2022 5685  Gross per 24 hour   Intake 1200 ml   Output 2885 ml   Net -1685 ml       Recent Labs     09/24/22  0447 09/24/22  1415 09/25/22  0452   WBC 8.0 12.2* 7.4   HGB 11.2* 11.0* 10.1*   HCT 33.5* 30.4* 29.8*   MCV 98.5 95.3 97.4    303 262     Recent Labs     09/24/22  1415 09/24/22  2203 09/25/22  0452   * 137 138   K 5.4* 4.9 4.6   CL 97* 103 104   CO2 22 21 20   BUN 30* 30* 28*   CREATININE 2.71* 2.73* 2.66*       Recent Labs     09/24/22  1429   COLORU Yellow   PHUR 5.0   WBCUA None   RBCUA 0 TO 2   SPECGRAV 1.007   LEUKOCYTESUR NEGATIVE   UROBILINOGEN Normal   BILIRUBINUR NEGATIVE       Additional Lab/culture results:    Physical Exam:   General: sleeping comfortably in room, no acute distress  Head: atraumatic, normocephalic   HEENT: moist membranes, PERRLA, EOMI  Respiratory: normal respiratory effort on room air  Cardiac: regular rate  Abdomen: soft, nontender, nondistended  Extremities: SCDs in place and functioning  : solomon in place with yellow urine         Interval Imaging Findings:    Impression:    65year old male  Active  problem list  Left hydroureteronephrosis with hutch diverticulum  Solitary left kidney  S/p Right nephrectomy due to [address]     Plan:   Maintain solomon catheter for maximal decompression, 300cc came out when solomon was placed yesterday   CT A/P showed moderate left hydronephrosis and hydroureter with bilateral cystic structures seen at the UVJ   Renal ultrasound showed diffuse bladder wall thickening with Hutch diverticulum and left moderate hydronephrosis  Minimize constipation, recommend scheduled bowel regiment   Minimize narcotics and anticholingerics as able  Ambulate, out of bed  Trend creatinine, Cr 2.66 today from 2.73  UA negative for infection, would recommend stopping Rocephin from a urological standpoint  Plan to get renal US in 2-3 days to ensure resolution of hydronephrosis   Continue to monitor   He needs to follow up outpatient for cystoscopy, left retrograde pyelogram to assess his hydronephrosis     Alverto Douglass MD  8:27 AM 9/25/2022  See previous consultation note. No acute  intervention for left hydronephrosis since patient asymptomatic, creat has trended back to baseline. Will need Cystoscopy and Left retrograde pyelogram as outpatient. Home with indwelling solomon catheter and .outpatient voiding trial.  Continue Flomax/tamsulosin 0.4 mg po qd for BPH symptoms. I have reviewed the history above and agree. I have repeated the key portions of the physical exam and concur with the resident's findings. I have reviewed all laboratory findings and imaging reports/films.   I agree with the plan as noted above.     Electronically signed by Jonny Lopez MD on 9/25/2022 at 12:23 PM
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
## 2022-09-25 NOTE — CARE COORDINATION
09/25/22 0904   Service Assessment   Patient Orientation Sedated   Cognition Other (see comment)  (fatigued)   History Provided By Patient;Significant Other   Primary Caregiver Self   Support Systems Spouse/Significant Other   Patient's Healthcare Decision Maker is: Legal Next of Kin   PCP Verified by CM No  (hasn't seen new PCP yet. Apt Oct 3rd)   Prior Functional Level Independent in ADLs/IADLs   Current Functional Level Assistance with the following:;Bathing;Dressing; Toileting   Can patient return to prior living arrangement Unknown at present   Family able to assist with home care needs:   (girlfriend)   Social/Functional History   Lives With Significant other  (girlfirend)   Type of [address] Multi-level;Bed/Bath upstairs   Home Access Stairs to enter with rails   Entrance Stairs - Number of Steps 5   Bathroom Shower/Tub Tub/Shower unit   Bathroom Toilet Standard   Receives Help From Other (comment)  (girlfriend)   ADL Assistance Independent   Ambulation Assistance Independent   Transfer Assistance Independent   Active  No   Mode of Transportation Friends; Family   Occupation On disability; Unemployed   Discharge Planning   Living Arrangements Spouse/Significant Other   Potential Assistance Purchasing Medications Yes   Patient expects to be discharged to: Unknown   One/Two Story Residence Two story   Services At/After Discharge   [employer] Information Provided? No   Mode of Transport at Discharge   (girlfriend)   Condition of Participation: Discharge Planning   The Plan for Transition of Care is related to the following treatment goals: home with inffusion center if needed   Plan is to return home with girlfriend. Saw Bee Venegas MD several yrs ago. Has apt Oct 3rd with new MD in that office. Agreeable to infusion center if sent on IV ABX. Watch for therapy needs.
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
## 2022-09-25 NOTE — PLAN OF CARE
Problem: Discharge Planning  Goal: Discharge to home or other facility with appropriate resources  9/25/2022 0837 by Sarita Casillas RN  Outcome: Progressing  9/25/2022 0257 by Kyung Jonas RN  Outcome: Progressing     Problem: Pain  Goal: Verbalizes/displays adequate comfort level or baseline comfort level  Outcome: Progressing  Flowsheets (Taken 9/25/2022 0815)  Verbalizes/displays adequate comfort level or baseline comfort level: Encourage patient to monitor pain and request assistance
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
## 2022-09-26 ENCOUNTER — APPOINTMENT (OUTPATIENT)
Dept: MRI IMAGING | Age: 62
DRG: 085 | End: 2022-09-26
Payer: MEDICARE

## 2022-09-26 PROBLEM — I10 HYPERTENSION: Status: ACTIVE | Noted: 2022-09-26

## 2022-09-26 PROBLEM — Z87.828 HISTORY OF HEAD INJURY: Status: ACTIVE | Noted: 2022-09-26

## 2022-09-26 PROBLEM — S06.0XAA CEREBRAL CONCUSSION: Status: ACTIVE | Noted: 2022-09-26

## 2022-09-26 PROBLEM — G40.909 SEIZURE DISORDER (HCC): Status: ACTIVE | Noted: 2022-09-26
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
## 2022-09-26 LAB
ABSOLUTE EOS #: 0.12 K/UL (ref 0–0.44)
ABSOLUTE IMMATURE GRANULOCYTE: <0.03 K/UL (ref 0–0.3)
ABSOLUTE LYMPH #: 1.56 K/UL (ref 1.1–3.7)
ABSOLUTE MONO #: 1.02 K/UL (ref 0.1–1.2)
ANION GAP SERPL CALCULATED.3IONS-SCNC: 11 MMOL/L (ref 9–17)
ANTI DNA DOUBLE STRANDED: 0.7 IU/ML
ANTI-NUCLEAR ANTIBODY (ANA): NEGATIVE
BASOPHILS # BLD: 1 % (ref 0–2)
BASOPHILS ABSOLUTE: 0.04 K/UL (ref 0–0.2)
BUN BLDV-MCNC: 23 MG/DL (ref 8–23)
CALCIUM SERPL-MCNC: 8.5 MG/DL (ref 8.6–10.4)
CHLORIDE BLD-SCNC: 109 MMOL/L (ref 98–107)
CO2: 17 MMOL/L (ref 20–31)
CREAT SERPL-MCNC: 2.29 MG/DL (ref 0.7–1.2)
ENA ANTIBODIES SCREEN: 0.2 U/ML
EOSINOPHILS RELATIVE PERCENT: 2 % (ref 1–4)
GFR AFRICAN AMERICAN: 35 ML/MIN
GFR NON-AFRICAN AMERICAN: 29 ML/MIN
GFR SERPL CREATININE-BSD FRML MDRD: ABNORMAL ML/MIN/{1.73_M2}
GLUCOSE BLD-MCNC: 95 MG/DL (ref 70–99)
HCT VFR BLD CALC: 28.2 % (ref 40.7–50.3)
HEMOGLOBIN: 9.8 G/DL (ref 13–17)
IMMATURE GRANULOCYTES: 0 %
LYMPHOCYTES # BLD: 23 % (ref 24–43)
MCH RBC QN AUTO: 33.9 PG (ref 25.2–33.5)
MCHC RBC AUTO-ENTMCNC: 34.8 G/DL (ref 28.4–34.8)
MCV RBC AUTO: 97.6 FL (ref 82.6–102.9)
MONOCYTES # BLD: 15 % (ref 3–12)
NRBC AUTOMATED: 0 PER 100 WBC
PDW BLD-RTO: 11.8 % (ref 11.8–14.4)
PLATELET # BLD: 255 K/UL (ref 138–453)
PMV BLD AUTO: 9.2 FL (ref 8.1–13.5)
POTASSIUM SERPL-SCNC: 4.1 MMOL/L (ref 3.7–5.3)
RBC # BLD: 2.89 M/UL (ref 4.21–5.77)
SEG NEUTROPHILS: 59 % (ref 36–65)
SEGMENTED NEUTROPHILS ABSOLUTE COUNT: 4 K/UL (ref 1.5–8.1)
SODIUM BLD-SCNC: 137 MMOL/L (ref 135–144)
WBC # BLD: 6.8 K/UL (ref 3.5–11.3)

## 2022-09-26 PROCEDURE — 6370000000 HC RX 637 (ALT 250 FOR IP): Performed by: STUDENT IN AN ORGANIZED HEALTH CARE EDUCATION/TRAINING PROGRAM

## 2022-09-26 PROCEDURE — 6360000002 HC RX W HCPCS: Performed by: STUDENT IN AN ORGANIZED HEALTH CARE EDUCATION/TRAINING PROGRAM

## 2022-09-26 PROCEDURE — 99222 1ST HOSP IP/OBS MODERATE 55: CPT | Performed by: PSYCHIATRY & NEUROLOGY

## 2022-09-26 PROCEDURE — 2060000000 HC ICU INTERMEDIATE R&B

## 2022-09-26 PROCEDURE — 6370000000 HC RX 637 (ALT 250 FOR IP)

## 2022-09-26 PROCEDURE — 99232 SBSQ HOSP IP/OBS MODERATE 35: CPT | Performed by: STUDENT IN AN ORGANIZED HEALTH CARE EDUCATION/TRAINING PROGRAM

## 2022-09-26 PROCEDURE — 2500000003 HC RX 250 WO HCPCS

## 2022-09-26 PROCEDURE — 6370000000 HC RX 637 (ALT 250 FOR IP): Performed by: INTERNAL MEDICINE

## 2022-09-26 PROCEDURE — 99232 SBSQ HOSP IP/OBS MODERATE 35: CPT | Performed by: INTERNAL MEDICINE

## 2022-09-26 PROCEDURE — 97116 GAIT TRAINING THERAPY: CPT

## 2022-09-26 PROCEDURE — 80048 BASIC METABOLIC PNL TOTAL CA: CPT

## 2022-09-26 PROCEDURE — 2580000003 HC RX 258

## 2022-09-26 PROCEDURE — 2500000003 HC RX 250 WO HCPCS: Performed by: STUDENT IN AN ORGANIZED HEALTH CARE EDUCATION/TRAINING PROGRAM

## 2022-09-26 PROCEDURE — 70551 MRI BRAIN STEM W/O DYE: CPT

## 2022-09-26 PROCEDURE — 36415 COLL VENOUS BLD VENIPUNCTURE: CPT

## 2022-09-26 PROCEDURE — 85025 COMPLETE CBC W/AUTO DIFF WBC: CPT

## 2022-09-26 PROCEDURE — 6360000002 HC RX W HCPCS

## 2022-09-26 RX ORDER — LABETALOL 200 MG/1
200 TABLET, FILM COATED ORAL EVERY 8 HOURS SCHEDULED
Status: DISCONTINUED | OUTPATIENT
Start: 2022-09-26 | End: 2022-09-28 | Stop reason: HOSPADM

## 2022-09-26 RX ORDER — SODIUM BICARBONATE 650 MG/1
1300 TABLET ORAL 2 TIMES DAILY
Status: DISCONTINUED | OUTPATIENT
Start: 2022-09-26 | End: 2022-09-28 | Stop reason: HOSPADM

## 2022-09-26 RX ORDER — PHENYTOIN SODIUM 100 MG/1
100 CAPSULE, EXTENDED RELEASE ORAL 3 TIMES DAILY
Status: ON HOLD | COMMUNITY
End: 2022-09-27 | Stop reason: SDUPTHER

## 2022-09-26 RX ADMIN — TAMSULOSIN HYDROCHLORIDE 0.4 MG: 0.4 CAPSULE ORAL at 08:16

## 2022-09-26 RX ADMIN — HYDRALAZINE HYDROCHLORIDE 100 MG: 50 TABLET, FILM COATED ORAL at 12:14

## 2022-09-26 RX ADMIN — HYDRALAZINE HYDROCHLORIDE 100 MG: 50 TABLET, FILM COATED ORAL at 21:26

## 2022-09-26 RX ADMIN — LABETALOL HYDROCHLORIDE 200 MG: 200 TABLET, FILM COATED ORAL at 21:27

## 2022-09-26 RX ADMIN — LABETALOL HYDROCHLORIDE 200 MG: 200 TABLET, FILM COATED ORAL at 12:14

## 2022-09-26 RX ADMIN — Medication 5 ML: at 16:34

## 2022-09-26 RX ADMIN — AMLODIPINE BESYLATE 10 MG: 10 TABLET ORAL at 08:16

## 2022-09-26 RX ADMIN — SODIUM CHLORIDE, PRESERVATIVE FREE 10 ML: 5 INJECTION INTRAVENOUS at 08:15

## 2022-09-26 RX ADMIN — LABETALOL HYDROCHLORIDE 200 MG: 200 TABLET, FILM COATED ORAL at 08:16

## 2022-09-26 RX ADMIN — SODIUM CHLORIDE, PRESERVATIVE FREE 10 ML: 5 INJECTION INTRAVENOUS at 21:27

## 2022-09-26 RX ADMIN — ACETAMINOPHEN 650 MG: 325 TABLET ORAL at 14:05

## 2022-09-26 RX ADMIN — SODIUM CHLORIDE: 9 INJECTION, SOLUTION INTRAVENOUS at 21:37

## 2022-09-26 RX ADMIN — HEPARIN SODIUM 5000 UNITS: 5000 INJECTION INTRAVENOUS; SUBCUTANEOUS at 21:26

## 2022-09-26 RX ADMIN — HEPARIN SODIUM 5000 UNITS: 5000 INJECTION INTRAVENOUS; SUBCUTANEOUS at 12:14

## 2022-09-26 RX ADMIN — THIAMINE HYDROCHLORIDE: 100 INJECTION, SOLUTION INTRAMUSCULAR; INTRAVENOUS at 08:16

## 2022-09-26 RX ADMIN — CEFTRIAXONE SODIUM 1000 MG: 10 INJECTION, POWDER, FOR SOLUTION INTRAVENOUS at 16:34

## 2022-09-26 RX ADMIN — SODIUM BICARBONATE 1300 MG: 648 TABLET ORAL at 21:27

## 2022-09-26 RX ADMIN — HEPARIN SODIUM 5000 UNITS: 5000 INJECTION INTRAVENOUS; SUBCUTANEOUS at 05:31

## 2022-09-26 RX ADMIN — HYDRALAZINE HYDROCHLORIDE 100 MG: 50 TABLET, FILM COATED ORAL at 05:31

## 2022-09-26 ASSESSMENT — PAIN SCALES - GENERAL
PAINLEVEL_OUTOF10: 0
PAINLEVEL_OUTOF10: 5
PAINLEVEL_OUTOF10: 0

## 2022-09-26 NOTE — PROGRESS NOTES
[employer]upational [email or web address]  Occupational Therapy Not Seen Note    DATE: 2022    NAME: Trudy Bell  MRN: [id number]   : 1960      Patient not seen this date for Occupational Therapy due to:      Per RN change in mentation and going for MRI. Will continue as able.       Electronically signed by MELBA Garcia on 2022 at 2:47 PM
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
## 2022-09-26 NOTE — PLAN OF CARE
Problem: Discharge Planning  Goal: Discharge to home or other facility with appropriate resources  Outcome: Progressing  Flowsheets (Taken 9/26/2022 1133)  Discharge to home or other facility with appropriate resources: Identify barriers to discharge with patient and caregiver     Problem: Pain  Goal: Verbalizes/displays adequate comfort level or baseline comfort level  Outcome: Progressing     Problem: Safety - Adult  Goal: Free from fall injury  Outcome: Progressing

## 2022-09-26 NOTE — PROGRESS NOTES
SPIRITUAL CARE DEPARTMENT - [address]  PROGRESS NOTE    Shift date: 9/26/2022   Shift day: Monday   Shift # 1    Room # 9505/6439-92   Name: Mitchel Parker                Holiness: Elsyla [address] 34 of [employer] orthodox: Unknown    Referral: Routine Visit    Admit Date & Time: 9/24/2022  8:22 AM    Assessment:  Mitchel Parker is a 65 y.o. male in the hospital because hydronephrosis of the left kidney. Upon entering the room writer observes patient is asleep in bed and spouse is sitting in chair watching the television. Intervention:  Writer introduced self and title as  .  notices patient curled up on bed sleeping and his spouse sits in chair watching the television. Outcome:   asked if there was anything he could do. Spouse said she was fine and that the patient didn't sleep well during the night. Plan:  Chaplains will remain available to offer spiritual and emotional support as needed. Electronically signed by Joe Vasquez Resident, on 9/26/2022 at 2:07 PM.  [facility]  [phone]           09/26/22 1406   Encounter Summary   Service Provided For: Family; Patient not available   Referral/Consult From: Nurse   Support System Significant other   Last Encounter  09/26/22   Complexity of Encounter Low   Begin Time 0145   End Time  0150   Total Time Calculated 5 min   Assessment/Intervention/Outcome   Assessment Unable to assess   Intervention Active listening;Sustaining Presence/Ministry of presence   Outcome Other (comment)  (Patient sleeping)

## 2022-09-26 NOTE — PLAN OF CARE
Problem: Pain  Goal: Verbalizes/displays adequate comfort level or baseline comfort level  9/25/2022 2225 by Elmo Islas RN  Outcome: Progressing  Flowsheets  Taken 9/25/2022 1552 by Jaxon David RN  Verbalizes/displays adequate comfort level or baseline comfort level: Encourage patient to monitor pain and request assistance  Taken 9/25/2022 1526 by Jaxon David RN  Verbalizes/displays adequate comfort level or baseline comfort level: Encourage patient to monitor pain and request assistance  9/25/2022 0837 by Jaxon David RN  Outcome: Progressing  Flowsheets (Taken 9/25/2022 0815)  Verbalizes/displays adequate comfort level or baseline comfort level: Encourage patient to monitor pain and request assistance   Pt's pain assessed frequently with hourly rounding; assessed all pain characteristics including level, type, location, frequency, and onset. Pt medicated by RN per PRN orders. Non-pharmacologic interventions offered to pt as well. Pt states pain is tolerable at this time. Will continue to monitor.     Electronically signed by Elmo Islas RN on 9/25/2022 at 10:25 PM
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
## 2022-09-26 NOTE — PROGRESS NOTES
[address]     Admission Medication Reconciliation       The patient's list of current home medications has been reviewed. Source(s) of information: Interview with patient's significant other (patient sleeping)    Based on information provided by the above source(s), I have updated the patient's home med list as described below. Please review the ACTION REQUESTED section of this note below for any discrepancies on current hospital orders. I changed or updated the following medications on the patient's home medication list:  Other Notes Patient previously taking phenytoin  mg three times daily. He is not established with a PCP and is unsure if he is supposed to still be taking it. Used to see Luis Antoniosingh Barnes at [facility], but she is not longer there. Patient's SO does state that he has an appointment there on 10/3/2022. Please recommend the patient discusses status of phenytoin therapy during that appointment. Please feel free to call me with any questions about this encounter. Thank you.     Thank you  Chepe Peace, PharmD, [facility]  Inpatient Clinical Pharmacist  [phone]      Electronically signed by Celia Stapleton on 9/26/2022 at 2:39 PM
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
## 2022-09-27 LAB
ABSOLUTE EOS #: 0.17 K/UL (ref 0–0.44)
ABSOLUTE IMMATURE GRANULOCYTE: <0.03 K/UL (ref 0–0.3)
ABSOLUTE LYMPH #: 1.49 K/UL (ref 1.1–3.7)
ABSOLUTE MONO #: 0.91 K/UL (ref 0.1–1.2)
ALBUMIN (CALCULATED): 4 G/DL (ref 3.2–5.2)
ALBUMIN PERCENT: 64 % (ref 45–65)
ALPHA 1 PERCENT: 3 % (ref 3–6)
ALPHA 2 PERCENT: 10 % (ref 6–13)
ALPHA-1-GLOBULIN: 0.2 G/DL (ref 0.1–0.4)
ALPHA-2-GLOBULIN: 0.6 G/DL (ref 0.5–0.9)
ANION GAP SERPL CALCULATED.3IONS-SCNC: 13 MMOL/L (ref 9–17)
BASOPHILS # BLD: 1 % (ref 0–2)
BASOPHILS ABSOLUTE: 0.04 K/UL (ref 0–0.2)
BETA GLOBULIN: 0.6 G/DL (ref 0.5–1.1)
BETA PERCENT: 10 % (ref 11–19)
BUN BLDV-MCNC: 19 MG/DL (ref 8–23)
CALCIUM SERPL-MCNC: 8.8 MG/DL (ref 8.6–10.4)
CHLORIDE BLD-SCNC: 107 MMOL/L (ref 98–107)
CO2: 20 MMOL/L (ref 20–31)
CREAT SERPL-MCNC: 2.18 MG/DL (ref 0.7–1.2)
EOSINOPHILS RELATIVE PERCENT: 3 % (ref 1–4)
GAMMA GLOBULIN %: 14 % (ref 9–20)
GAMMA GLOBULIN: 0.9 G/DL (ref 0.5–1.5)
GFR AFRICAN AMERICAN: 37 ML/MIN
GFR NON-AFRICAN AMERICAN: 31 ML/MIN
GFR SERPL CREATININE-BSD FRML MDRD: ABNORMAL ML/MIN/{1.73_M2}
GLUCOSE BLD-MCNC: 102 MG/DL (ref 70–99)
HCT VFR BLD CALC: 29.7 % (ref 40.7–50.3)
HEMOGLOBIN: 9.9 G/DL (ref 13–17)
IMMATURE GRANULOCYTES: 0 %
LYMPHOCYTES # BLD: 23 % (ref 24–43)
MAGNESIUM: 1.7 MG/DL (ref 1.6–2.6)
MCH RBC QN AUTO: 32.8 PG (ref 25.2–33.5)
MCHC RBC AUTO-ENTMCNC: 33.3 G/DL (ref 28.4–34.8)
MCV RBC AUTO: 98.3 FL (ref 82.6–102.9)
MONOCYTES # BLD: 14 % (ref 3–12)
NRBC AUTOMATED: 0 PER 100 WBC
P E INTERPRETATION, U: NORMAL
PATHOLOGIST: ABNORMAL
PATHOLOGIST: NORMAL
PATHOLOGIST: NORMAL
PDW BLD-RTO: 11.9 % (ref 11.8–14.4)
PLATELET # BLD: 260 K/UL (ref 138–453)
PMV BLD AUTO: 9.2 FL (ref 8.1–13.5)
POTASSIUM SERPL-SCNC: 4 MMOL/L (ref 3.7–5.3)
PROTEIN ELECTROPHORESIS, SERUM: ABNORMAL
RBC # BLD: 3.02 M/UL (ref 4.21–5.77)
SEG NEUTROPHILS: 59 % (ref 36–65)
SEGMENTED NEUTROPHILS ABSOLUTE COUNT: 3.92 K/UL (ref 1.5–8.1)
SERUM IFX INTERP: NORMAL
SODIUM BLD-SCNC: 140 MMOL/L (ref 135–144)
SPECIMEN TYPE: NORMAL
TOTAL PROT. SUM,%: 101 % (ref 98–102)
TOTAL PROT. SUM: 6.3 G/DL (ref 6.3–8.2)
TOTAL PROTEIN: 6.3 G/DL (ref 6.4–8.3)
URINE IFX INTERP: NORMAL
URINE IFX SPECIMEN: NORMAL
URINE TOTAL PROTEIN: 40 MG/DL
URINE TOTAL PROTEIN: 40 MG/DL
VOLUME: NORMAL ML
WBC # BLD: 6.5 K/UL (ref 3.5–11.3)

## 2022-09-27 PROCEDURE — 6370000000 HC RX 637 (ALT 250 FOR IP)

## 2022-09-27 PROCEDURE — 6360000002 HC RX W HCPCS: Performed by: STUDENT IN AN ORGANIZED HEALTH CARE EDUCATION/TRAINING PROGRAM

## 2022-09-27 PROCEDURE — 6370000000 HC RX 637 (ALT 250 FOR IP): Performed by: STUDENT IN AN ORGANIZED HEALTH CARE EDUCATION/TRAINING PROGRAM

## 2022-09-27 PROCEDURE — 99232 SBSQ HOSP IP/OBS MODERATE 35: CPT | Performed by: STUDENT IN AN ORGANIZED HEALTH CARE EDUCATION/TRAINING PROGRAM

## 2022-09-27 PROCEDURE — 6360000002 HC RX W HCPCS

## 2022-09-27 PROCEDURE — 97116 GAIT TRAINING THERAPY: CPT

## 2022-09-27 PROCEDURE — 85025 COMPLETE CBC W/AUTO DIFF WBC: CPT

## 2022-09-27 PROCEDURE — 6370000000 HC RX 637 (ALT 250 FOR IP): Performed by: INTERNAL MEDICINE

## 2022-09-27 PROCEDURE — 36415 COLL VENOUS BLD VENIPUNCTURE: CPT

## 2022-09-27 PROCEDURE — 2500000003 HC RX 250 WO HCPCS

## 2022-09-27 PROCEDURE — 97110 THERAPEUTIC EXERCISES: CPT

## 2022-09-27 PROCEDURE — 83735 ASSAY OF MAGNESIUM: CPT

## 2022-09-27 PROCEDURE — 2500000003 HC RX 250 WO HCPCS: Performed by: STUDENT IN AN ORGANIZED HEALTH CARE EDUCATION/TRAINING PROGRAM

## 2022-09-27 PROCEDURE — 80048 BASIC METABOLIC PNL TOTAL CA: CPT

## 2022-09-27 PROCEDURE — 2060000000 HC ICU INTERMEDIATE R&B

## 2022-09-27 PROCEDURE — 99232 SBSQ HOSP IP/OBS MODERATE 35: CPT | Performed by: NURSE PRACTITIONER

## 2022-09-27 PROCEDURE — 2580000003 HC RX 258

## 2022-09-27 RX ORDER — PHENYTOIN SODIUM 100 MG/1
100 CAPSULE, EXTENDED RELEASE ORAL 3 TIMES DAILY
Qty: 60 CAPSULE | Refills: 3 | Status: SHIPPED | OUTPATIENT
Start: 2022-09-27 | End: 2022-11-02 | Stop reason: SDUPTHER

## 2022-09-27 RX ORDER — MAGNESIUM SULFATE 1 G/100ML
1000 INJECTION INTRAVENOUS ONCE
Status: COMPLETED | OUTPATIENT
Start: 2022-09-27 | End: 2022-09-27

## 2022-09-27 RX ORDER — ATORVASTATIN CALCIUM 40 MG/1
40 TABLET, FILM COATED ORAL NIGHTLY
Status: DISCONTINUED | OUTPATIENT
Start: 2022-09-27 | End: 2022-09-28 | Stop reason: HOSPADM

## 2022-09-27 RX ORDER — CLONIDINE 0.1 MG/24H
1 PATCH, EXTENDED RELEASE TRANSDERMAL WEEKLY
Status: DISCONTINUED | OUTPATIENT
Start: 2022-09-27 | End: 2022-09-28 | Stop reason: HOSPADM

## 2022-09-27 RX ORDER — PHENYTOIN SODIUM 100 MG/1
100 CAPSULE, EXTENDED RELEASE ORAL 3 TIMES DAILY
Status: DISCONTINUED | OUTPATIENT
Start: 2022-09-27 | End: 2022-09-27

## 2022-09-27 RX ADMIN — THIAMINE HYDROCHLORIDE: 100 INJECTION, SOLUTION INTRAMUSCULAR; INTRAVENOUS at 08:34

## 2022-09-27 RX ADMIN — HEPARIN SODIUM 5000 UNITS: 5000 INJECTION INTRAVENOUS; SUBCUTANEOUS at 06:26

## 2022-09-27 RX ADMIN — HYDRALAZINE HYDROCHLORIDE 100 MG: 50 TABLET, FILM COATED ORAL at 20:00

## 2022-09-27 RX ADMIN — DESMOPRESSIN ACETATE 40 MG: 0.2 TABLET ORAL at 19:55

## 2022-09-27 RX ADMIN — SODIUM BICARBONATE 1300 MG: 648 TABLET ORAL at 19:54

## 2022-09-27 RX ADMIN — SODIUM BICARBONATE 1300 MG: 648 TABLET ORAL at 08:34

## 2022-09-27 RX ADMIN — CEFTRIAXONE SODIUM 1000 MG: 10 INJECTION, POWDER, FOR SOLUTION INTRAVENOUS at 16:21

## 2022-09-27 RX ADMIN — AMLODIPINE BESYLATE 10 MG: 10 TABLET ORAL at 08:34

## 2022-09-27 RX ADMIN — TAMSULOSIN HYDROCHLORIDE 0.4 MG: 0.4 CAPSULE ORAL at 08:39

## 2022-09-27 RX ADMIN — SODIUM CHLORIDE, PRESERVATIVE FREE 10 ML: 5 INJECTION INTRAVENOUS at 19:58

## 2022-09-27 RX ADMIN — HYDRALAZINE HYDROCHLORIDE 100 MG: 50 TABLET, FILM COATED ORAL at 13:32

## 2022-09-27 RX ADMIN — LABETALOL HYDROCHLORIDE 200 MG: 200 TABLET, FILM COATED ORAL at 06:28

## 2022-09-27 RX ADMIN — ACETAMINOPHEN 650 MG: 325 TABLET ORAL at 12:44

## 2022-09-27 RX ADMIN — HEPARIN SODIUM 5000 UNITS: 5000 INJECTION INTRAVENOUS; SUBCUTANEOUS at 13:32

## 2022-09-27 RX ADMIN — LABETALOL HYDROCHLORIDE 200 MG: 200 TABLET, FILM COATED ORAL at 20:00

## 2022-09-27 RX ADMIN — HYDRALAZINE HYDROCHLORIDE 100 MG: 50 TABLET, FILM COATED ORAL at 06:27

## 2022-09-27 RX ADMIN — LABETALOL HYDROCHLORIDE 200 MG: 200 TABLET, FILM COATED ORAL at 13:32

## 2022-09-27 RX ADMIN — HEPARIN SODIUM 5000 UNITS: 5000 INJECTION INTRAVENOUS; SUBCUTANEOUS at 20:00

## 2022-09-27 RX ADMIN — MAGNESIUM SULFATE HEPTAHYDRATE 1000 MG: 1 INJECTION, SOLUTION INTRAVENOUS at 11:18

## 2022-09-27 ASSESSMENT — PAIN SCALES - GENERAL: PAINLEVEL_OUTOF10: 7
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
## 2022-09-27 NOTE — CARE COORDINATION
[facility] ICU Quality Flow/Interdisciplinary Rounds Progress Note    Quality Flow Rounds held on September 27, 2022 at [address] Attending:  Bedside Nurse, , , and Nursing Unit Leadership    Anticipated Discharge Date:       Anticipated Discharge Disposition: home w/ SO    Readmission Risk              Risk of Unplanned Readmission:  20           Discussed patient goal for the day, patient clinical progression, and barriers to discharge.   The following Goal(s) of the Day/Commitment(s) have been identified:  Activity Progression  PT/OT and d/c teaching if home with solomon catheter      Courtney Downey RN  September 27, 2022
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
## 2022-09-27 NOTE — PLAN OF CARE
Problem: Discharge Planning  Goal: Discharge to home or other facility with appropriate resources  Outcome: Progressing  Flowsheets  Taken 9/27/2022 1131  Discharge to home or other facility with appropriate resources: Identify barriers to discharge with patient and caregiver  Taken 9/27/2022 0717  Discharge to home or other facility with appropriate resources: Identify barriers to discharge with patient and caregiver     Problem: Pain  Goal: Verbalizes/displays adequate comfort level or baseline comfort level  Outcome: Progressing     Problem: Safety - Adult  Goal: Free from fall injury  Outcome: Progressing  Flowsheets (Taken 9/27/2022 0913)  Free From Fall Injury: Instruct family/caregiver on patient safety

## 2022-09-27 NOTE — PLAN OF CARE
Problem: Discharge Planning  Goal: Discharge to home or other facility with appropriate resources  9/26/2022 2342 by Carla Urban RN  Outcome: Rashawn Gillmer (Taken 9/26/2022 1654 by Matt Block RN)  Discharge to home or other facility with appropriate resources: Identify barriers to discharge with patient and caregiver  9/26/2022 1456 by Matt Block RN  Outcome: Progressing  Flowsheets (Taken 9/26/2022 1133)  Discharge to home or other facility with appropriate resources: Identify barriers to discharge with patient and caregiver     Problem: Pain  Goal: Verbalizes/displays adequate comfort level or baseline comfort level  9/26/2022 2342 by Carla Urban RN  Outcome: Progressing  9/26/2022 1456 by Matt Block RN  Outcome: Progressing     Problem: Safety - Adult  Goal: Free from fall injury  9/26/2022 2342 by Carla Urban RN  Outcome: Progressing  9/26/2022 1456 by Matt Block RN  Outcome: Progressing SEVERITY:- NORMAL ECG -

SINUS RHYTHM

:

Confirmed by: Fransisco Pickens MD 03-Nov-2019 01:07:47

## 2022-09-28 VITALS
RESPIRATION RATE: 17 BRPM | BODY MASS INDEX: 20.85 KG/M2 | DIASTOLIC BLOOD PRESSURE: 87 MMHG | SYSTOLIC BLOOD PRESSURE: 137 MMHG | WEIGHT: 171.3 LBS | TEMPERATURE: 98.2 F | HEART RATE: 92 BPM

## 2022-09-28 PROBLEM — I47.29 NONSUSTAINED PAROXYSMAL VENTRICULAR TACHYCARDIA (HCC): Status: ACTIVE | Noted: 2022-09-28

## 2022-09-28 LAB
ABSOLUTE EOS #: 0.28 K/UL (ref 0–0.44)
ABSOLUTE IMMATURE GRANULOCYTE: <0.03 K/UL (ref 0–0.3)
ABSOLUTE LYMPH #: 1.98 K/UL (ref 1.1–3.7)
ABSOLUTE MONO #: 0.91 K/UL (ref 0.1–1.2)
ANION GAP SERPL CALCULATED.3IONS-SCNC: 10 MMOL/L (ref 9–17)
BASOPHILS # BLD: 1 % (ref 0–2)
BASOPHILS ABSOLUTE: 0.06 K/UL (ref 0–0.2)
BUN BLDV-MCNC: 15 MG/DL (ref 8–23)
CALCIUM SERPL-MCNC: 9.1 MG/DL (ref 8.6–10.4)
CHLORIDE BLD-SCNC: 107 MMOL/L (ref 98–107)
CO2: 23 MMOL/L (ref 20–31)
CREAT SERPL-MCNC: 2.01 MG/DL (ref 0.7–1.2)
EOSINOPHILS RELATIVE PERCENT: 4 % (ref 1–4)
GFR AFRICAN AMERICAN: 41 ML/MIN
GFR NON-AFRICAN AMERICAN: 34 ML/MIN
GFR SERPL CREATININE-BSD FRML MDRD: ABNORMAL ML/MIN/{1.73_M2}
GLUCOSE BLD-MCNC: 100 MG/DL (ref 70–99)
HCT VFR BLD CALC: 28.4 % (ref 40.7–50.3)
HEMOGLOBIN: 9.8 G/DL (ref 13–17)
IMMATURE GRANULOCYTES: 0 %
LYMPHOCYTES # BLD: 31 % (ref 24–43)
MCH RBC QN AUTO: 34.1 PG (ref 25.2–33.5)
MCHC RBC AUTO-ENTMCNC: 34.5 G/DL (ref 28.4–34.8)
MCV RBC AUTO: 99 FL (ref 82.6–102.9)
MONOCYTES # BLD: 14 % (ref 3–12)
NRBC AUTOMATED: 0 PER 100 WBC
PDW BLD-RTO: 11.7 % (ref 11.8–14.4)
PLATELET # BLD: 286 K/UL (ref 138–453)
PMV BLD AUTO: 9.3 FL (ref 8.1–13.5)
POTASSIUM SERPL-SCNC: 3.8 MMOL/L (ref 3.7–5.3)
RBC # BLD: 2.87 M/UL (ref 4.21–5.77)
SEG NEUTROPHILS: 50 % (ref 36–65)
SEGMENTED NEUTROPHILS ABSOLUTE COUNT: 3.11 K/UL (ref 1.5–8.1)
SODIUM BLD-SCNC: 140 MMOL/L (ref 135–144)
WBC # BLD: 6.4 K/UL (ref 3.5–11.3)

## 2022-09-28 PROCEDURE — 36415 COLL VENOUS BLD VENIPUNCTURE: CPT

## 2022-09-28 PROCEDURE — 99232 SBSQ HOSP IP/OBS MODERATE 35: CPT | Performed by: STUDENT IN AN ORGANIZED HEALTH CARE EDUCATION/TRAINING PROGRAM

## 2022-09-28 PROCEDURE — 2580000003 HC RX 258

## 2022-09-28 PROCEDURE — 6370000000 HC RX 637 (ALT 250 FOR IP)

## 2022-09-28 PROCEDURE — 6360000002 HC RX W HCPCS

## 2022-09-28 PROCEDURE — 85025 COMPLETE CBC W/AUTO DIFF WBC: CPT

## 2022-09-28 PROCEDURE — 6370000000 HC RX 637 (ALT 250 FOR IP): Performed by: INTERNAL MEDICINE

## 2022-09-28 PROCEDURE — 6370000000 HC RX 637 (ALT 250 FOR IP): Performed by: STUDENT IN AN ORGANIZED HEALTH CARE EDUCATION/TRAINING PROGRAM

## 2022-09-28 PROCEDURE — 80048 BASIC METABOLIC PNL TOTAL CA: CPT

## 2022-09-28 PROCEDURE — 2500000003 HC RX 250 WO HCPCS

## 2022-09-28 RX ORDER — CLONIDINE 0.1 MG/24H
1 PATCH, EXTENDED RELEASE TRANSDERMAL WEEKLY
Qty: 4 PATCH | Refills: 1 | Status: SHIPPED | OUTPATIENT
Start: 2022-10-04

## 2022-09-28 RX ORDER — LABETALOL 100 MG/1
200 TABLET, FILM COATED ORAL 3 TIMES DAILY
Qty: 60 TABLET | Refills: 3 | Status: SHIPPED | OUTPATIENT
Start: 2022-09-28

## 2022-09-28 RX ORDER — ATORVASTATIN CALCIUM 40 MG/1
40 TABLET, FILM COATED ORAL NIGHTLY
Qty: 30 TABLET | Refills: 3 | Status: SHIPPED | OUTPATIENT
Start: 2022-09-28

## 2022-09-28 RX ORDER — MAGNESIUM SULFATE IN WATER 40 MG/ML
2000 INJECTION, SOLUTION INTRAVENOUS ONCE
Status: COMPLETED | OUTPATIENT
Start: 2022-09-28 | End: 2022-09-28

## 2022-09-28 RX ORDER — TAMSULOSIN HYDROCHLORIDE 0.4 MG/1
0.4 CAPSULE ORAL DAILY
Qty: 30 CAPSULE | Refills: 3 | Status: SHIPPED | OUTPATIENT
Start: 2022-09-29

## 2022-09-28 RX ORDER — HYDRALAZINE HYDROCHLORIDE 100 MG/1
100 TABLET, FILM COATED ORAL EVERY 8 HOURS SCHEDULED
Qty: 90 TABLET | Refills: 3 | Status: SHIPPED | OUTPATIENT
Start: 2022-09-28

## 2022-09-28 RX ORDER — ASPIRIN 81 MG/1
81 TABLET ORAL DAILY
Qty: 90 TABLET | Refills: 1 | Status: SHIPPED | OUTPATIENT
Start: 2022-09-28

## 2022-09-28 RX ORDER — MAGNESIUM SULFATE HEPTAHYDRATE 40 MG/ML
2000 INJECTION, SOLUTION INTRAVENOUS ONCE
Status: DISCONTINUED | OUTPATIENT
Start: 2022-09-28 | End: 2022-09-28

## 2022-09-28 RX ORDER — CEPHALEXIN 250 MG/1
250 CAPSULE ORAL 4 TIMES DAILY
Qty: 20 CAPSULE | Refills: 0 | Status: SHIPPED | OUTPATIENT
Start: 2022-09-28 | End: 2022-10-03

## 2022-09-28 RX ORDER — CEPHALEXIN 250 MG/1
250 CAPSULE ORAL EVERY 6 HOURS SCHEDULED
Status: DISCONTINUED | OUTPATIENT
Start: 2022-09-28 | End: 2022-09-28 | Stop reason: HOSPADM

## 2022-09-28 RX ORDER — SODIUM BICARBONATE 650 MG/1
650 TABLET ORAL 2 TIMES DAILY
Qty: 60 TABLET | Refills: 0 | Status: SHIPPED | OUTPATIENT
Start: 2022-09-28 | End: 2022-10-28

## 2022-09-28 RX ADMIN — HEPARIN SODIUM 5000 UNITS: 5000 INJECTION INTRAVENOUS; SUBCUTANEOUS at 06:05

## 2022-09-28 RX ADMIN — HYDRALAZINE HYDROCHLORIDE 100 MG: 50 TABLET, FILM COATED ORAL at 13:10

## 2022-09-28 RX ADMIN — LABETALOL HYDROCHLORIDE 200 MG: 200 TABLET, FILM COATED ORAL at 06:05

## 2022-09-28 RX ADMIN — CEPHALEXIN 250 MG: 250 CAPSULE ORAL at 09:09

## 2022-09-28 RX ADMIN — LABETALOL HYDROCHLORIDE 200 MG: 200 TABLET, FILM COATED ORAL at 13:10

## 2022-09-28 RX ADMIN — SODIUM BICARBONATE 1300 MG: 648 TABLET ORAL at 09:09

## 2022-09-28 RX ADMIN — ACETAMINOPHEN 650 MG: 325 TABLET ORAL at 09:09

## 2022-09-28 RX ADMIN — AMLODIPINE BESYLATE 10 MG: 10 TABLET ORAL at 09:09

## 2022-09-28 RX ADMIN — HYDRALAZINE HYDROCHLORIDE 100 MG: 50 TABLET, FILM COATED ORAL at 06:05

## 2022-09-28 RX ADMIN — MAGNESIUM SULFATE HEPTAHYDRATE 2000 MG: 40 INJECTION, SOLUTION INTRAVENOUS at 09:57

## 2022-09-28 RX ADMIN — THIAMINE HYDROCHLORIDE: 100 INJECTION, SOLUTION INTRAMUSCULAR; INTRAVENOUS at 09:06

## 2022-09-28 RX ADMIN — TAMSULOSIN HYDROCHLORIDE 0.4 MG: 0.4 CAPSULE ORAL at 09:09

## 2022-09-28 ASSESSMENT — PAIN SCALES - GENERAL: PAINLEVEL_OUTOF10: 5

## 2022-09-28 NOTE — PLAN OF CARE
Problem: Discharge Planning  Goal: Discharge to home or other facility with appropriate resources  9/28/2022 1228 by Apollo Nuno RN  Outcome: Progressing  Flowsheets  Taken 9/28/2022 1222  Discharge to home or other facility with appropriate resources: Identify barriers to discharge with patient and caregiver  Taken 9/28/2022 0753  Discharge to home or other facility with appropriate resources: Identify barriers to discharge with patient and caregiver  9/27/2022 2357 by Chata Geiger RN  Outcome: Progressing  Flowsheets (Taken 9/27/2022 1806 by Apollo Nuno RN)  Discharge to home or other facility with appropriate resources: Identify barriers to discharge with patient and caregiver     Problem: Pain  Goal: Verbalizes/displays adequate comfort level or baseline comfort level  9/28/2022 1228 by Apollo Nuno RN  Outcome: Progressing  9/27/2022 2357 by Chata Geiger RN  Outcome: Progressing     Problem: Safety - Adult  Goal: Free from fall injury  9/28/2022 1228 by Apollo Nuno RN  Outcome: Progressing  Flowsheets (Taken 9/28/2022 0934)  Free From Fall Injury: Instruct family/caregiver on patient safety  9/27/2022 2357 by Chata Geiger RN  Outcome: Progressing

## 2022-09-28 NOTE — PROGRESS NOTES
CLINICAL PHARMACY NOTE: MEDS TO BEDS    Total # of Prescriptions Filled: 8   The following medications were delivered to the patient:  Atorvastatin 40 mg tab  Clonidine0.1 mg patch  Cephalexin 250 mg cap  Tamsulosin 0.4 mg cap  Labetalol 200 mg tab  Aspirin low dose 81 mg tab  Sodium bicarb 650 mg tab   Phenytoin er 100 mg cap    Additional Documentation:

## 2022-09-28 NOTE — PLAN OF CARE
Problem: Discharge Planning  Goal: Discharge to home or other facility with appropriate resources  9/28/2022 1259 by Shahana Castaneda RN  Outcome: Completed  9/28/2022 1228 by Shahana Castaneda RN  Outcome: Progressing  Flowsheets  Taken 9/28/2022 1222  Discharge to home or other facility with appropriate resources: Identify barriers to discharge with patient and caregiver  Taken 9/28/2022 0753  Discharge to home or other facility with appropriate resources: Identify barriers to discharge with patient and caregiver  9/27/2022 2357 by Shabana Louis RN  Outcome: Skyla Headley (Taken 9/27/2022 1806 by Shahana Castaneda RN)  Discharge to home or other facility with appropriate resources: Identify barriers to discharge with patient and caregiver     Problem: Pain  Goal: Verbalizes/displays adequate comfort level or baseline comfort level  9/28/2022 1259 by Shahana Castaneda RN  Outcome: Completed  9/28/2022 1228 by Shahana Castaneda RN  Outcome: Progressing  9/27/2022 2357 by Shabana Louis RN  Outcome: Progressing     Problem: Safety - Adult  Goal: Free from fall injury  9/28/2022 1259 by Shahana Castaneda RN  Outcome: Completed  9/28/2022 1228 by Shahana Castaneda RN  Outcome: Progressing  Flowsheets (Taken 9/28/2022 0934)  Free From Fall Injury: Instruct family/caregiver on patient safety  9/27/2022 2357 by Shabana Louis RN  Outcome: Progressing

## 2022-09-28 NOTE — DISCHARGE INSTRUCTIONS
Please follow up with Urology, GI team, neurology and nephrology. Please call and make appointment  Please start taking dilantin 100 mg tid per neurology for history of seizures and f/u with them for further management  Please maintain solomon's and f/u with urology out patient for solomon management, out patient cystoscopy procedure and pyelogram.   Please get BMP done in one week and f/u with nephrology for chronic kidney disease  Please f/u with GI for recurrent pseudocyst  Take medications as prescribed - start taking lipitor 40 mg qd, finish course of keflex  Start taking clonidine for uncontrolled blood pressure and f/u with your PCP for further management, increased your labetalol 200 to three times a day and increase hydralazine to 100 mg three times a day.    Please take sodium bicarb 650 mg tablet twice daily, get bmp done in one week and f/u with dr. Maeve Casarez

## 2022-09-28 NOTE — CARE COORDINATION
Discharge 1 [facility] Case Management Department  Written by: Dipika Drew RN    Patient Name: Shauna Moran  Attending Provider: Kylie Nunez MD  Admit Date: 2022  8:22 AM  MRN: [id number]  Account: [de-identified]                     : 1960  Discharge Date: 22      Disposition: home, no needs      Dipika Drew RN
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
## 2022-09-28 NOTE — PLAN OF CARE
Problem: Discharge Planning  Goal: Discharge to home or other facility with appropriate resources  9/27/2022 2357 by Jose Luis Pastor RN  Outcome: Progressing  Flowsheets (Taken 9/27/2022 1806 by Kecia Burt RN)  Discharge to home or other facility with appropriate resources: Identify barriers to discharge with patient and caregiver  9/27/2022 1412 by Kecia Burt RN  Outcome: Progressing  Flowsheets  Taken 9/27/2022 1131  Discharge to home or other facility with appropriate resources: Identify barriers to discharge with patient and caregiver  Taken 9/27/2022 0717  Discharge to home or other facility with appropriate resources: Identify barriers to discharge with patient and caregiver     Problem: Pain  Goal: Verbalizes/displays adequate comfort level or baseline comfort level  9/27/2022 2357 by Jose Luis Pastor RN  Outcome: Progressing  9/27/2022 1412 by Kecia Burt RN  Outcome: Progressing     Problem: Safety - Adult  Goal: Free from fall injury  9/27/2022 2357 by Jose Luis Pastor RN  Outcome: Progressing  9/27/2022 1412 by Kecia Burt RN  Outcome: Progressing  Flowsheets (Taken 9/27/2022 0913)  Free From Fall Injury: Instruct family/caregiver on patient safety
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
## 2022-10-03 ENCOUNTER — OFFICE VISIT (OUTPATIENT)
Dept: UROLOGY | Age: 62
End: 2022-10-03
Payer: MEDICARE

## 2022-10-03 VITALS
WEIGHT: 171 LBS | HEIGHT: 76 IN | SYSTOLIC BLOOD PRESSURE: 136 MMHG | BODY MASS INDEX: 20.82 KG/M2 | HEART RATE: 66 BPM | DIASTOLIC BLOOD PRESSURE: 84 MMHG

## 2022-10-03 DIAGNOSIS — R33.9 URINARY RETENTION: Primary | ICD-10-CM

## 2022-10-03 DIAGNOSIS — N40.1 BPH WITH OBSTRUCTION/LOWER URINARY TRACT SYMPTOMS: ICD-10-CM

## 2022-10-03 DIAGNOSIS — N13.8 BPH WITH OBSTRUCTION/LOWER URINARY TRACT SYMPTOMS: ICD-10-CM

## 2022-10-03 PROCEDURE — 99204 OFFICE O/P NEW MOD 45 MIN: CPT | Performed by: UROLOGY

## 2022-10-03 ASSESSMENT — ENCOUNTER SYMPTOMS
SHORTNESS OF BREATH: 0
EYES NEGATIVE: 1
CONSTIPATION: 0
EYE REDNESS: 0
RESPIRATORY NEGATIVE: 1
NAUSEA: 0
COUGH: 0
GASTROINTESTINAL NEGATIVE: 1
WHEEZING: 0
EYE PAIN: 0
BACK PAIN: 0
DIARRHEA: 0
ABDOMINAL PAIN: 0
VOMITING: 0

## 2022-10-03 NOTE — PROGRESS NOTES
Review of Systems   Constitutional: Negative. Negative for appetite change, chills and fatigue. Eyes: Negative. Negative for pain, redness and visual disturbance. Respiratory: Negative. Negative for cough, shortness of breath and wheezing. Cardiovascular: Negative. Negative for chest pain and leg swelling. Gastrointestinal: Negative. Negative for abdominal pain, constipation, diarrhea, nausea and vomiting. Genitourinary: Negative. Negative for difficulty urinating, dysuria, flank pain, frequency, hematuria and urgency. Musculoskeletal: Negative. Negative for back pain, joint swelling and myalgias. Skin: Negative. Negative for rash and wound. Neurological: Negative. Negative for dizziness, weakness and numbness. Hematological: Negative. Does not bruise/bleed easily.    Hansen cath in place
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
## 2022-11-02 ENCOUNTER — OFFICE VISIT (OUTPATIENT)
Dept: NEUROLOGY | Age: 62
End: 2022-11-02
Payer: MEDICARE

## 2022-11-02 VITALS
DIASTOLIC BLOOD PRESSURE: 80 MMHG | WEIGHT: 164 LBS | BODY MASS INDEX: 19.36 KG/M2 | HEART RATE: 88 BPM | SYSTOLIC BLOOD PRESSURE: 128 MMHG | HEIGHT: 77 IN

## 2022-11-02 DIAGNOSIS — G40.909 SEIZURE DISORDER (HCC): Primary | ICD-10-CM

## 2022-11-02 PROCEDURE — 3074F SYST BP LT 130 MM HG: CPT | Performed by: PSYCHIATRY & NEUROLOGY

## 2022-11-02 PROCEDURE — 3079F DIAST BP 80-89 MM HG: CPT | Performed by: PSYCHIATRY & NEUROLOGY

## 2022-11-02 PROCEDURE — 99214 OFFICE O/P EST MOD 30 MIN: CPT | Performed by: PSYCHIATRY & NEUROLOGY

## 2022-11-02 RX ORDER — MELATONIN
1000 DAILY
Qty: 90 TABLET | Refills: 1 | Status: SHIPPED | OUTPATIENT
Start: 2022-11-02

## 2022-11-02 RX ORDER — PHENYTOIN SODIUM 100 MG/1
100 CAPSULE, EXTENDED RELEASE ORAL 3 TIMES DAILY
Qty: 270 CAPSULE | Refills: 2 | Status: SHIPPED | OUTPATIENT
Start: 2022-11-02 | End: 2023-01-31
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
## 2022-11-08 RX ORDER — LABETALOL 200 MG/1
TABLET, FILM COATED ORAL
Qty: 90 TABLET | OUTPATIENT
Start: 2022-11-08

## 2022-11-21 RX ORDER — LABETALOL 200 MG/1
TABLET, FILM COATED ORAL
Qty: 90 TABLET | OUTPATIENT
Start: 2022-11-21

## 2022-11-21 RX ORDER — TAMSULOSIN HYDROCHLORIDE 0.4 MG/1
CAPSULE ORAL
Qty: 90 CAPSULE | OUTPATIENT
Start: 2022-11-21

## 2022-11-21 RX ORDER — CLONIDINE 0.1 MG/24H
PATCH, EXTENDED RELEASE TRANSDERMAL
Qty: 4 PATCH | Refills: 1 | OUTPATIENT
Start: 2022-11-21

## 2022-11-22 RX ORDER — HYDRALAZINE HYDROCHLORIDE 100 MG/1
100 TABLET, FILM COATED ORAL EVERY 8 HOURS SCHEDULED
Qty: 90 TABLET | Refills: 3 | OUTPATIENT
Start: 2022-11-22

## 2023-04-11 RX ORDER — ASPIRIN 81 MG/1
TABLET, COATED ORAL
Qty: 90 TABLET | Refills: 1 | OUTPATIENT
Start: 2023-04-11

## 2023-05-03 ENCOUNTER — OFFICE VISIT (OUTPATIENT)
Dept: NEUROLOGY | Age: 63
End: 2023-05-03
Payer: MEDICARE

## 2023-05-03 VITALS
HEART RATE: 80 BPM | DIASTOLIC BLOOD PRESSURE: 106 MMHG | HEIGHT: 77 IN | SYSTOLIC BLOOD PRESSURE: 163 MMHG | BODY MASS INDEX: 19.22 KG/M2 | WEIGHT: 162.8 LBS

## 2023-05-03 DIAGNOSIS — G40.909 SEIZURE DISORDER (HCC): Primary | ICD-10-CM

## 2023-05-03 PROCEDURE — 3077F SYST BP >= 140 MM HG: CPT | Performed by: PSYCHIATRY & NEUROLOGY

## 2023-05-03 PROCEDURE — 3080F DIAST BP >= 90 MM HG: CPT | Performed by: PSYCHIATRY & NEUROLOGY

## 2023-05-03 PROCEDURE — 99214 OFFICE O/P EST MOD 30 MIN: CPT | Performed by: PSYCHIATRY & NEUROLOGY

## 2023-05-03 RX ORDER — ROSUVASTATIN CALCIUM 10 MG/1
10 TABLET, COATED ORAL EVERY MORNING
COMMUNITY
Start: 2023-02-15

## 2023-05-03 RX ORDER — SODIUM BICARBONATE 650 MG/1
TABLET ORAL
COMMUNITY
Start: 2023-03-23
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
## 2024-05-06 NOTE — TELEPHONE ENCOUNTER
Pharmacy requesting refill of phenytoin 100mg ER.      Medication active on med list yes      Date of last Rx: 11/2/2022 with 2 refills          verified by JAMIN CATRER      Date of last appointment 5/3/2023    Next Visit Date:  7/17/2024

## 2024-05-07 RX ORDER — PHENYTOIN SODIUM 100 MG/1
100 CAPSULE, EXTENDED RELEASE ORAL 3 TIMES DAILY
Qty: 270 CAPSULE | Refills: 1 | Status: SHIPPED | OUTPATIENT
Start: 2024-05-07

## 2025-05-19 NOTE — TELEPHONE ENCOUNTER
Pharmacy requesting refill of Dilantin 100 mg ER.    Medication active on med list yes    Date of last Rx: 5/7/2024 #270 with 1 refills   verified by BRISA Velez    Date of last appointment 5/3/2023    Next Visit Date:  Visit date not found, patient cancelled his last appointment and never rescheduled. Also patient should have been out of medication after 6 months, no refills requested since the original fill in May 2024. Called and left message for patient, not active on mychart.

## 2025-05-21 NOTE — TELEPHONE ENCOUNTER
Spoke with patient, he scheduled for 6/19/2025. Will give a month supply, patient is aware he HAS to come for this appointment to get any additional medication.

## 2025-05-22 RX ORDER — PHENYTOIN SODIUM 100 MG/1
100 CAPSULE, EXTENDED RELEASE ORAL 3 TIMES DAILY
Qty: 90 CAPSULE | Refills: 0 | Status: SHIPPED | OUTPATIENT
Start: 2025-05-22 | End: 2025-06-21

## 2025-06-19 ENCOUNTER — TELEPHONE (OUTPATIENT)
Dept: NEUROLOGY | Age: 65
End: 2025-06-19

## 2025-06-19 ENCOUNTER — OFFICE VISIT (OUTPATIENT)
Dept: NEUROLOGY | Age: 65
End: 2025-06-19
Payer: MEDICARE

## 2025-06-19 VITALS
DIASTOLIC BLOOD PRESSURE: 132 MMHG | HEIGHT: 76 IN | HEART RATE: 80 BPM | SYSTOLIC BLOOD PRESSURE: 206 MMHG | WEIGHT: 157.8 LBS | BODY MASS INDEX: 19.22 KG/M2

## 2025-06-19 DIAGNOSIS — G40.909 SEIZURE DISORDER (HCC): Primary | ICD-10-CM

## 2025-06-19 DIAGNOSIS — G40.309 GENERALIZED NONCONVULSIVE EPILEPSY (HCC): ICD-10-CM

## 2025-06-19 PROCEDURE — 99214 OFFICE O/P EST MOD 30 MIN: CPT | Performed by: PSYCHIATRY & NEUROLOGY

## 2025-06-19 PROCEDURE — G8420 CALC BMI NORM PARAMETERS: HCPCS | Performed by: PSYCHIATRY & NEUROLOGY

## 2025-06-19 PROCEDURE — G8427 DOCREV CUR MEDS BY ELIG CLIN: HCPCS | Performed by: PSYCHIATRY & NEUROLOGY

## 2025-06-19 PROCEDURE — 3077F SYST BP >= 140 MM HG: CPT | Performed by: PSYCHIATRY & NEUROLOGY

## 2025-06-19 PROCEDURE — 3017F COLORECTAL CA SCREEN DOC REV: CPT | Performed by: PSYCHIATRY & NEUROLOGY

## 2025-06-19 PROCEDURE — 4004F PT TOBACCO SCREEN RCVD TLK: CPT | Performed by: PSYCHIATRY & NEUROLOGY

## 2025-06-19 PROCEDURE — 3080F DIAST BP >= 90 MM HG: CPT | Performed by: PSYCHIATRY & NEUROLOGY

## 2025-06-19 RX ORDER — LAMOTRIGINE 25 MG/1
TABLET ORAL
Qty: 168 TABLET | Refills: 0 | Status: SHIPPED | OUTPATIENT
Start: 2025-06-19

## 2025-06-19 NOTE — PROGRESS NOTES
Sensitivity  [x] Sound Sensitivity   Endocrinology []Excessive thirst  []Excessive hunger   Psychiatric [] Anxiety/Depression  [] Hallucination   Allergy/immunology []Hives/environmental allergies   Hematologic/lymph [] Abnormal bleeding  [] Abnormal bruising     Neurological examination:  Domain Findings   Mental Status Alert and oriented; no confusion, intact memory; slow speech; mild anomic pauses noted without aphasia or dysarthria   Cranial Nerves CN II-XII intact; no papilledema, facial symmetry preserved, EOM full, no nystagmus or MATEO; no ptosis   Motor Normal bulk and tone; strength 5/5 diffusely; mild distal left lower limb dragging by history not observed on exam   Sensory Intact to light touch, pin, vibration, proprioception   Cerebellar Normal fine finger movements, no tremors or dysmetria   Reflexes 2+ and symmetric throughout; negative Babinski   Gait Narrow-based, stable; no limping or ataxia observed   General examination: Normocephalic, atraumatic head with intact extraocular movements, unlabored respirations, normal ENT findings, a regular heart rate and rhythm, a non-tender abdomen without masses, no cyanosis or edema in the extremities with 2+ pulses, and intact skin of normal color.  Neurological Workup:  Date Study Findings   09/26/2022 MRI Brain w/wo contrast Chronic microvascular changes, remote left parietotemporal infarct, no acute changes   08/20/2018 CT Head ([facility]) Left parietotemporal encephalomalacia, postsurgical changes, no acute bleed   N/A CTA Head/Neck Not documented   N/A 2D Echo Not documented   N/A PHILPI Not documented   N/A EEG Not documented   06/29/2025 Labs Labs pending; Phenytoin levels to be drawn; lipid panel and B12 not available   Seizure history:  First Seizure Semiology Last Seizure   1998 Focal to bilateral tonic-clonic; prodrome of smell, taste; incontinence, tongue biting 08/2018   Medication history:  Medication Dosage/Frequency Status Reason for Discontinuation

## 2025-06-19 NOTE — TELEPHONE ENCOUNTER
Patient came into the office today for a visit. First BP reading was 212/129, Second reading was 214/127, Third reading was 206/132. Attempted to contact patient's PCP per Dr. Juarez, but their office was closed for the holiday. Will contact them tomorrow morning.

## 2025-06-20 ENCOUNTER — HOSPITAL ENCOUNTER (INPATIENT)
Age: 65
LOS: 5 days | Discharge: HOME | DRG: 281 | End: 2025-06-25
Attending: EMERGENCY MEDICINE | Admitting: HOSPITALIST
Payer: MEDICARE

## 2025-06-20 ENCOUNTER — APPOINTMENT (OUTPATIENT)
Dept: GENERAL RADIOLOGY | Age: 65
DRG: 281 | End: 2025-06-20
Payer: MEDICARE

## 2025-06-20 DIAGNOSIS — N18.4 STAGE 4 CHRONIC KIDNEY DISEASE (HCC): ICD-10-CM

## 2025-06-20 DIAGNOSIS — I24.9 ACUTE CORONARY SYNDROME (HCC): ICD-10-CM

## 2025-06-20 DIAGNOSIS — I10 ESSENTIAL HYPERTENSION: Primary | ICD-10-CM

## 2025-06-20 LAB
ALBUMIN SERPL-MCNC: 4.1 G/DL (ref 3.5–5.2)
ALBUMIN/GLOB SERPL: 1.3 {RATIO} (ref 1–2.5)
ALP SERPL-CCNC: 80 U/L (ref 40–129)
ALT SERPL-CCNC: 11 U/L (ref 10–50)
ANION GAP SERPL CALCULATED.3IONS-SCNC: 11 MMOL/L (ref 9–16)
AST SERPL-CCNC: 23 U/L (ref 10–50)
BASOPHILS # BLD: 0.04 K/UL (ref 0–0.2)
BASOPHILS NFR BLD: 1 % (ref 0–2)
BILIRUB SERPL-MCNC: 0.4 MG/DL (ref 0–1.2)
BNP SERPL-MCNC: 5379 PG/ML (ref 0–125)
BUN SERPL-MCNC: 36 MG/DL (ref 8–23)
CALCIUM SERPL-MCNC: 9.3 MG/DL (ref 8.6–10.4)
CHLORIDE SERPL-SCNC: 107 MMOL/L (ref 98–107)
CO2 SERPL-SCNC: 22 MMOL/L (ref 20–31)
CREAT SERPL-MCNC: 3 MG/DL (ref 0.7–1.2)
EOSINOPHIL # BLD: 0.11 K/UL (ref 0–0.44)
EOSINOPHILS RELATIVE PERCENT: 2 % (ref 1–4)
ERYTHROCYTE [DISTWIDTH] IN BLOOD BY AUTOMATED COUNT: 12.8 % (ref 11.8–14.4)
GFR, ESTIMATED: 23 ML/MIN/1.73M2
GLUCOSE SERPL-MCNC: 110 MG/DL (ref 74–99)
HCT VFR BLD AUTO: 32.3 % (ref 40.7–50.3)
HGB BLD-MCNC: 10.3 G/DL (ref 13–17)
IMM GRANULOCYTES # BLD AUTO: <0.03 K/UL (ref 0–0.3)
IMM GRANULOCYTES NFR BLD: 0 %
LIPASE SERPL-CCNC: 77 U/L (ref 13–60)
LYMPHOCYTES NFR BLD: 1.5 K/UL (ref 1.1–3.7)
LYMPHOCYTES RELATIVE PERCENT: 27 % (ref 24–43)
MCH RBC QN AUTO: 32.9 PG (ref 25.2–33.5)
MCHC RBC AUTO-ENTMCNC: 31.9 G/DL (ref 28.4–34.8)
MCV RBC AUTO: 103.2 FL (ref 82.6–102.9)
MONOCYTES NFR BLD: 0.69 K/UL (ref 0.1–1.2)
MONOCYTES NFR BLD: 12 % (ref 3–12)
NEUTROPHILS NFR BLD: 58 % (ref 36–65)
NEUTS SEG NFR BLD: 3.3 K/UL (ref 1.5–8.1)
NRBC BLD-RTO: 0 PER 100 WBC
PLATELET # BLD AUTO: 238 K/UL (ref 138–453)
PMV BLD AUTO: 9.8 FL (ref 8.1–13.5)
POTASSIUM SERPL-SCNC: 5 MMOL/L (ref 3.7–5.3)
PROT SERPL-MCNC: 7.3 G/DL (ref 6.6–8.7)
RBC # BLD AUTO: 3.13 M/UL (ref 4.21–5.77)
RBC # BLD: ABNORMAL 10*6/UL
SODIUM SERPL-SCNC: 140 MMOL/L (ref 136–145)
TROPONIN I SERPL HS-MCNC: 68 NG/L (ref 0–22)
TROPONIN I SERPL HS-MCNC: 69 NG/L (ref 0–22)
TSH SERPL DL<=0.05 MIU/L-ACNC: 2.12 UIU/ML (ref 0.27–4.2)
WBC OTHER # BLD: 5.7 K/UL (ref 3.5–11.3)

## 2025-06-20 PROCEDURE — 2060000000 HC ICU INTERMEDIATE R&B

## 2025-06-20 PROCEDURE — 93005 ELECTROCARDIOGRAM TRACING: CPT | Performed by: HOSPITALIST

## 2025-06-20 PROCEDURE — 85025 COMPLETE CBC W/AUTO DIFF WBC: CPT

## 2025-06-20 PROCEDURE — 99223 1ST HOSP IP/OBS HIGH 75: CPT | Performed by: HOSPITALIST

## 2025-06-20 PROCEDURE — 93005 ELECTROCARDIOGRAM TRACING: CPT

## 2025-06-20 PROCEDURE — 99285 EMERGENCY DEPT VISIT HI MDM: CPT

## 2025-06-20 PROCEDURE — 6360000002 HC RX W HCPCS

## 2025-06-20 PROCEDURE — 84443 ASSAY THYROID STIM HORMONE: CPT

## 2025-06-20 PROCEDURE — 83690 ASSAY OF LIPASE: CPT

## 2025-06-20 PROCEDURE — 2500000003 HC RX 250 WO HCPCS

## 2025-06-20 PROCEDURE — 80053 COMPREHEN METABOLIC PANEL: CPT

## 2025-06-20 PROCEDURE — 83880 ASSAY OF NATRIURETIC PEPTIDE: CPT

## 2025-06-20 PROCEDURE — 2580000003 HC RX 258

## 2025-06-20 PROCEDURE — 71045 X-RAY EXAM CHEST 1 VIEW: CPT

## 2025-06-20 PROCEDURE — 36415 COLL VENOUS BLD VENIPUNCTURE: CPT

## 2025-06-20 PROCEDURE — 6370000000 HC RX 637 (ALT 250 FOR IP)

## 2025-06-20 PROCEDURE — 84484 ASSAY OF TROPONIN QUANT: CPT

## 2025-06-20 RX ORDER — POLYETHYLENE GLYCOL 3350 17 G/17G
17 POWDER, FOR SOLUTION ORAL DAILY PRN
Status: DISCONTINUED | OUTPATIENT
Start: 2025-06-20 | End: 2025-06-25 | Stop reason: HOSPADM

## 2025-06-20 RX ORDER — ACETAMINOPHEN 325 MG/1
650 TABLET ORAL EVERY 6 HOURS PRN
Status: DISCONTINUED | OUTPATIENT
Start: 2025-06-20 | End: 2025-06-25 | Stop reason: HOSPADM

## 2025-06-20 RX ORDER — LABETALOL HYDROCHLORIDE 5 MG/ML
10 INJECTION, SOLUTION INTRAVENOUS EVERY 4 HOURS
Status: DISCONTINUED | OUTPATIENT
Start: 2025-06-20 | End: 2025-06-21

## 2025-06-20 RX ORDER — SODIUM CHLORIDE 9 MG/ML
INJECTION, SOLUTION INTRAVENOUS PRN
Status: DISCONTINUED | OUTPATIENT
Start: 2025-06-20 | End: 2025-06-25 | Stop reason: HOSPADM

## 2025-06-20 RX ORDER — HYDRALAZINE HYDROCHLORIDE 50 MG/1
100 TABLET, FILM COATED ORAL ONCE
Status: COMPLETED | OUTPATIENT
Start: 2025-06-20 | End: 2025-06-20

## 2025-06-20 RX ORDER — HEPARIN SODIUM 5000 [USP'U]/ML
5000 INJECTION, SOLUTION INTRAVENOUS; SUBCUTANEOUS EVERY 8 HOURS SCHEDULED
Status: DISCONTINUED | OUTPATIENT
Start: 2025-06-20 | End: 2025-06-25 | Stop reason: HOSPADM

## 2025-06-20 RX ORDER — ONDANSETRON 4 MG/1
4 TABLET, ORALLY DISINTEGRATING ORAL EVERY 8 HOURS PRN
Status: DISCONTINUED | OUTPATIENT
Start: 2025-06-20 | End: 2025-06-25 | Stop reason: HOSPADM

## 2025-06-20 RX ORDER — ACETAMINOPHEN 650 MG/1
650 SUPPOSITORY RECTAL EVERY 6 HOURS PRN
Status: DISCONTINUED | OUTPATIENT
Start: 2025-06-20 | End: 2025-06-25 | Stop reason: HOSPADM

## 2025-06-20 RX ORDER — SODIUM CHLORIDE 0.9 % (FLUSH) 0.9 %
5-40 SYRINGE (ML) INJECTION EVERY 12 HOURS SCHEDULED
Status: DISCONTINUED | OUTPATIENT
Start: 2025-06-20 | End: 2025-06-25 | Stop reason: HOSPADM

## 2025-06-20 RX ORDER — ATORVASTATIN CALCIUM 40 MG/1
40 TABLET, FILM COATED ORAL NIGHTLY
Status: DISCONTINUED | OUTPATIENT
Start: 2025-06-20 | End: 2025-06-25 | Stop reason: HOSPADM

## 2025-06-20 RX ORDER — AMLODIPINE BESYLATE 10 MG/1
10 TABLET ORAL ONCE
Status: COMPLETED | OUTPATIENT
Start: 2025-06-20 | End: 2025-06-20

## 2025-06-20 RX ORDER — ONDANSETRON 2 MG/ML
4 INJECTION INTRAMUSCULAR; INTRAVENOUS EVERY 6 HOURS PRN
Status: DISCONTINUED | OUTPATIENT
Start: 2025-06-20 | End: 2025-06-25 | Stop reason: HOSPADM

## 2025-06-20 RX ORDER — SODIUM CHLORIDE 0.9 % (FLUSH) 0.9 %
5-40 SYRINGE (ML) INJECTION PRN
Status: DISCONTINUED | OUTPATIENT
Start: 2025-06-20 | End: 2025-06-25 | Stop reason: HOSPADM

## 2025-06-20 RX ORDER — TAMSULOSIN HYDROCHLORIDE 0.4 MG/1
0.4 CAPSULE ORAL DAILY
Status: DISCONTINUED | OUTPATIENT
Start: 2025-06-20 | End: 2025-06-24

## 2025-06-20 RX ADMIN — Medication 10 MG: at 20:40

## 2025-06-20 RX ADMIN — HYDRALAZINE HYDROCHLORIDE 100 MG: 50 TABLET ORAL at 15:02

## 2025-06-20 RX ADMIN — TAMSULOSIN HYDROCHLORIDE 0.4 MG: 0.4 CAPSULE ORAL at 20:40

## 2025-06-20 RX ADMIN — NICARDIPINE HYDROCHLORIDE 12 MG/HR: 25 INJECTION INTRAVENOUS at 23:06

## 2025-06-20 RX ADMIN — AMLODIPINE BESYLATE 10 MG: 10 TABLET ORAL at 15:02

## 2025-06-20 RX ADMIN — Medication 10 MG: at 17:48

## 2025-06-20 RX ADMIN — NICARDIPINE HYDROCHLORIDE 5 MG/HR: 25 INJECTION INTRAVENOUS at 18:50

## 2025-06-20 RX ADMIN — SODIUM CHLORIDE, PRESERVATIVE FREE 10 ML: 5 INJECTION INTRAVENOUS at 20:41

## 2025-06-20 RX ADMIN — ATORVASTATIN CALCIUM 40 MG: 40 TABLET, FILM COATED ORAL at 20:40

## 2025-06-20 ASSESSMENT — LIFESTYLE VARIABLES
HOW MANY STANDARD DRINKS CONTAINING ALCOHOL DO YOU HAVE ON A TYPICAL DAY: 1 OR 2
HOW OFTEN DO YOU HAVE A DRINK CONTAINING ALCOHOL: MONTHLY OR LESS

## 2025-06-20 ASSESSMENT — PAIN - FUNCTIONAL ASSESSMENT: PAIN_FUNCTIONAL_ASSESSMENT: NONE - DENIES PAIN

## 2025-06-20 NOTE — ED PROVIDER NOTES
[facility] EMERGENCY DEPARTMENT  Emergency Department Encounter  Emergency Medicine Resident     Pt Name:Maldonado Geiger  MRN: [id number]  Birthdate 1960  Date of evaluation: 6/20/25  PCP:  Fabi Anders MD  Note Started: 2:31 PM EDT      CHIEF COMPLAINT       Chief Complaint   Patient presents with    Hypertension       HISTORY OF PRESENT ILLNESS  (Location/Symptom, Timing/Onset, Context/Setting, Quality, Duration, Modifying Factors, Severity.)      Maldonado Geiger is a 64 y.o. male who presents with hypertension, headaches, blurry vision, abdominal pain.  Patient mentioned that he does not remember when he started having abdominal pain.  Patient denies nausea, vomiting, diarrhea.  Patient does state that he is not compliant with his medication at home.  Patient stated has been follow-up with his PCP and he took his blood pressure 3 times and it was high 200s systolic.  Patient states that it is not the worst headache ever in his life.  Patient has a history of kidney cancer on the right side, had nephrectomy.  PAST MEDICAL / SURGICAL / SOCIAL / FAMILY HISTORY      has a past medical history of Confusion, Head injury, Headache, Hypertension, and Stroke (cerebrum) (HCC).       has a past surgical history that includes CT CRYOABLATION OF RENAL TUMOR (10/2/2018).      Social History     Socioeconomic History    Marital status: Single     Spouse name: Not on file    Number of children: Not on file    Years of education: Not on file    Highest education level: Not on file   Occupational History    Not on file   Tobacco Use    Smoking status: Every Day     Current packs/day: 1.00     Types: Cigarettes     Passive exposure: Never    Smokeless tobacco: Never   Vaping Use    Vaping status: Never Used   Substance and Sexual Activity    Alcohol use: Yes     Comment: occasionally    Drug use: Yes     Types: Marijuana (Weed)    Sexual activity: Not on file   Other Topics Concern    Not on file   Social History

## 2025-06-20 NOTE — TELEPHONE ENCOUNTER
I contacted patient's listed PCP, Dr. Fabi Anders. She states that patient has not been seen since 1/2023; however, she did suggest that the patient go to the ER to be evaluated. I attempted to contact the patient, the line dropped. Attempted to contact his girlfriend, Yessenia--left voicemail advising he needs to go to the ER. Attempted to contact patient's son, Enrique--call would not go through. Attempted to contact patient again--left voicemail advising patient to go to the ER.

## 2025-06-20 NOTE — ED PROVIDER NOTES
[facility]     Emergency Department     Faculty Attestation    I performed a history and physical examination of the patient and discussed management with the resident. I reviewed the resident’s note and agree with the documented findings and plan of care. Any areas of disagreement are noted on the chart. I was personally present for the key portions of any procedures. I have documented in the chart those procedures where I was not present during the key portions. I have reviewed the emergency nurses triage note. I agree with the chief complaint, past medical history, past surgical history, allergies, medications, social and family history as documented unless otherwise noted below. Documentation of the HPI, Physical Exam and Medical Decision Making performed by medical students or scribes is based on my personal performance of the HPI, PE and MDM. For Physician Assistant/ Nurse Practitioner cases/documentation I have personally evaluated this patient and have completed at least one if not all key elements of the E/M (history, physical exam, and MDM). Additional findings are as noted.    Vital signs:   Vitals:    06/20/25 1429   BP:    Pulse:    Resp: 16   Temp:    SpO2:       64-year-old male presents due to elevated blood pressure.  Patient was sent here by his primary care.  Patient is noncompliant with his medications.  He states he does not take them because he was not born taking blood pressure medicine.  Patient reports mild headache and blurred vision.  No chest pain or shortness of breath.  Patient also does have left lower quadrant abdominal pain.  No nausea or vomiting.  History of diverticulitis in the past, according to his family member.  Patient also has a history of CKD, and has had a nephrectomy for cancer in the past.  On exam, he is alert and afebrile.  Breath sounds are clear and equal bilaterally.  Cardiac exam within normal rate, regular rhythm.  Abdomen is soft, mildly

## 2025-06-20 NOTE — ED NOTES
Patient given warm meal tray. Patient resting comfortably and in no acute distress. Respirations even and nonlabored. Patient denies any needs at this time. Bed in lowest position. Call light within reach. Will continue to monitor.  
Patient presents to ED c/o headache and blurred vision for the past four days with associated hypertension. Patient has not been taking his antihypertensive medications for the same amount of time. Patient was seen by his neurologist yesterday and family member received a call today from PCP requesting the patient come to ED.  Patient is alert and oriented x4, answering questions appropriately. Respirations even and unlabored. Patient changed into gown, placed on full cardiac monitor, BP cuff, and pulse ox. IV established. Call light within reach. Will continue to monitor.  
Report given to BRISA James. All questions answered at this time.  
The following labs were labeled with appropriate pt sticker and tubed to lab:     [] Blue     [] Lavender   [] on ice  [x] Green/yellow  [] Green/black [] on ice  [] Grey  [] on ice  [] Yellow  [] Red  [] Pink  [] Type/ Screen  [] ABG  [] VBG    [] COVID-19 swab    [] Rapid  [] PCR  [] Flu swab  [] Peds Viral Panel     [] Urine Sample  [] Fecal Sample  [] Pelvic Cultures  [] Blood Cultures  [] X 2  [] STREP Cultures  [] Wound Cultures  
The following labs were labeled with appropriate pt sticker and tubed to lab:     [x] Blue     [x] Lavender   [] on ice  [x] Green/yellow  [x] Green/black [] on ice  [] [place]  [] on ice  [x] Yellow  [] Red  [] Pink  [] Type/ Screen  [] ABG  [] VBG    [] COVID-19 swab    [] Rapid  [] PCR  [] Flu swab  [] Peds Viral Panel     [] Urine Sample  [] Fecal Sample  [] Pelvic Cultures  [] Blood Cultures  [] X 2  [] STREP Cultures  [] Wound Cultures  
Troponin elevated at 69. Repeat due at 1930. EKG being completed at this time. Cardene started. Patient denies chest pain, shortness of breath, any symptoms currently. Admitting team aware. Awaiting further orders.  
(CHOLECALCIFEROL) 25 MCG (1000 UT) TABS TABLET    Take 1 tablet by mouth daily     Orders Placed This Encounter   Medications    hydrALAZINE (APRESOLINE) tablet 100 mg    amLODIPine (NORVASC) tablet 10 mg    sodium chloride flush 0.9 % injection 5-40 mL    sodium chloride flush 0.9 % injection 5-40 mL    0.9 % sodium chloride infusion    OR Linked Order Group     ondansetron (ZOFRAN-ODT) disintegrating tablet 4 mg     ondansetron (ZOFRAN) injection 4 mg    polyethylene glycol (GLYCOLAX) packet 17 g    OR Linked Order Group     acetaminophen (TYLENOL) tablet 650 mg     acetaminophen (TYLENOL) suppository 650 mg    labetalol (NORMODYNE;TRANDATE) injection 10 mg    heparin (porcine) injection 5,000 Units       SURGICAL HISTORY       Past Surgical History:   Procedure Laterality Date    CT CRYOABLATION OF RENAL TUMOR  10/2/2018    CT CRYOABLATION OF RENAL TUMOR       PAST MEDICAL HISTORY       Past Medical History:   Diagnosis Date    Confusion     Head injury     Headache     Hypertension     Stroke (cerebrum) (HCC)        Labs:  Labs Reviewed   CBC WITH AUTO DIFFERENTIAL - Abnormal; Notable for the following components:       Result Value    RBC 3.13 (*)     Hemoglobin 10.3 (*)     Hematocrit 32.3 (*)     .2 (*)     All other components within normal limits   COMPREHENSIVE METABOLIC PANEL - Abnormal; Notable for the following components:    Glucose 110 (*)     BUN 36 (*)     Creatinine 3.0 (*)     Est, Glom Filt Rate 23 (*)     All other components within normal limits   LIPASE - Abnormal; Notable for the following components:    Lipase 77 (*)     All other components within normal limits   URINALYSIS WITH REFLEX TO CULTURE   TROPONIN   TROPONIN   TROPONIN   BRAIN NATRIURETIC PEPTIDE   URINE DRUG SCREEN       Electronically signed by Swati Rosenthal RN on 6/20/2025 at 5:41 PM

## 2025-06-20 NOTE — TELEPHONE ENCOUNTER
Attempted to contact PCP. Spoke with MA who stated that due to [facility]'s new rules, only the provider is able to receive abnormal results.

## 2025-06-20 NOTE — TELEPHONE ENCOUNTER
Patient's girlfriend, Yessenia, called office back. Informed her patient was advised to go to the ER. She verbalized understanding and will take him there.

## 2025-06-20 NOTE — H&P
Albumin/Globulin Ratio 1.3 1.0 - 2.5    Total Bilirubin 0.4 0.0 - 1.2 mg/dL    Alkaline Phosphatase 80 40 - 129 U/L    ALT 11 10 - 50 U/L    AST 23 10 - 50 U/L   Lipase    Collection Time: 06/20/25  3:13 PM   Result Value Ref Range    Lipase 77 (H) 13 - 60 U/L       Micro:  Results       ** No results found for the last 336 hours. **             Imaging:   No results found.    ASSESSMENT & PLAN     IMPRESSION    This is a 64 y.o. male who presented with high blood pressure, headache and blurry vision. Found to have hypertensive emergency. Admitted for IV Bp medications and further workup for end organ damage.    Principal Problem:    Hypertensive emergency  Resolved Problems:    * No resolved hospital problems. *     Hypertensive Emergencies:  SBP >180 OR DBP>120 in the presence of end-organ damage  Goal: Lower Diastolic BP to approximately 100-105 over 2-6 hours; max initial fall not to exceed 25%  CXR showing mild interstitial edema at right lung base  Continue Cardene drip  Will resume home medications as tolerated    Elevate troponin   Elevated proBnp 5379  69 , repeat 68  Patient does not have history of HF  Echo ordered  Cardiology consulted    HLD  Continue rosuvastatin    JARED on CKD  Baseline Cr  around 2, presented with 3  Patient does not appear dehydrated  It is possible that it might be his new baseline as we do not have any recent creatinine levels to compare  Check BMP daily  Will consider nephrology consult if renal function does not improve    History of seizure disorde  Was previously on phenytoin, 6/19 started on lamotrigine   Will resume home medications  Follows up with Dr. Juarez  Diet: ADULT DIET; Regular   Telemetry: Not indicated  DVT ppx: heparin    GI ppx: Not indicated    PT/OT: Consulted  Discharge Planning/SW:  consult for assistance with discharge planning    Aaron Osorio MD  Internal Medicine Resident  [facility]; [place], [place]  6/20/2025, 5:28 PM

## 2025-06-21 ENCOUNTER — APPOINTMENT (OUTPATIENT)
Dept: ULTRASOUND IMAGING | Age: 65
DRG: 281 | End: 2025-06-21
Payer: MEDICARE

## 2025-06-21 ENCOUNTER — APPOINTMENT (OUTPATIENT)
Age: 65
DRG: 281 | End: 2025-06-21
Payer: MEDICARE

## 2025-06-21 PROBLEM — I24.9 ACUTE CORONARY SYNDROME (HCC): Status: ACTIVE | Noted: 2025-06-21

## 2025-06-21 LAB
AMPHET UR QL SCN: NEGATIVE
ANION GAP SERPL CALCULATED.3IONS-SCNC: 12 MMOL/L (ref 9–16)
BACTERIA URNS QL MICRO: NORMAL
BARBITURATES UR QL SCN: NEGATIVE
BASOPHILS # BLD: 0.04 K/UL (ref 0–0.2)
BASOPHILS NFR BLD: 1 % (ref 0–2)
BENZODIAZ UR QL: NEGATIVE
BILIRUB UR QL STRIP: NEGATIVE
BUN SERPL-MCNC: 32 MG/DL (ref 8–23)
CALCIUM SERPL-MCNC: 8.7 MG/DL (ref 8.6–10.4)
CANNABINOIDS UR QL SCN: POSITIVE
CASTS #/AREA URNS LPF: NORMAL /LPF (ref 0–8)
CHLORIDE SERPL-SCNC: 110 MMOL/L (ref 98–107)
CHOLEST SERPL-MCNC: 163 MG/DL (ref 0–199)
CHOLESTEROL/HDL RATIO: 3
CLARITY UR: CLEAR
CO2 SERPL-SCNC: 18 MMOL/L (ref 20–31)
COCAINE UR QL SCN: NEGATIVE
COLOR UR: YELLOW
CREAT SERPL-MCNC: 2.9 MG/DL (ref 0.7–1.2)
EOSINOPHIL # BLD: 0.18 K/UL (ref 0–0.44)
EOSINOPHILS RELATIVE PERCENT: 3 % (ref 1–4)
EPI CELLS #/AREA URNS HPF: NORMAL /HPF (ref 0–5)
ERYTHROCYTE [DISTWIDTH] IN BLOOD BY AUTOMATED COUNT: 12.9 % (ref 11.8–14.4)
FENTANYL UR QL: NEGATIVE
GFR, ESTIMATED: 24 ML/MIN/1.73M2
GLUCOSE SERPL-MCNC: 131 MG/DL (ref 74–99)
GLUCOSE UR STRIP-MCNC: NEGATIVE MG/DL
HCT VFR BLD AUTO: 29.4 % (ref 40.7–50.3)
HDLC SERPL-MCNC: 55 MG/DL
HGB BLD-MCNC: 9.3 G/DL (ref 13–17)
HGB UR QL STRIP.AUTO: NEGATIVE
IMM GRANULOCYTES # BLD AUTO: <0.03 K/UL (ref 0–0.3)
IMM GRANULOCYTES NFR BLD: 0 %
KETONES UR STRIP-MCNC: NEGATIVE MG/DL
LDLC SERPL CALC-MCNC: 93 MG/DL (ref 0–100)
LEUKOCYTE ESTERASE UR QL STRIP: NEGATIVE
LYMPHOCYTES NFR BLD: 1.77 K/UL (ref 1.1–3.7)
LYMPHOCYTES RELATIVE PERCENT: 29 % (ref 24–43)
MAGNESIUM SERPL-MCNC: 2 MG/DL (ref 1.6–2.4)
MCH RBC QN AUTO: 32.3 PG (ref 25.2–33.5)
MCHC RBC AUTO-ENTMCNC: 31.6 G/DL (ref 28.4–34.8)
MCV RBC AUTO: 102.1 FL (ref 82.6–102.9)
METHADONE UR QL: NEGATIVE
MONOCYTES NFR BLD: 0.76 K/UL (ref 0.1–1.2)
MONOCYTES NFR BLD: 13 % (ref 3–12)
NEUTROPHILS NFR BLD: 54 % (ref 36–65)
NEUTS SEG NFR BLD: 3.33 K/UL (ref 1.5–8.1)
NITRITE UR QL STRIP: NEGATIVE
NRBC BLD-RTO: 0 PER 100 WBC
OPIATES UR QL SCN: NEGATIVE
OXYCODONE UR QL SCN: NEGATIVE
PCP UR QL SCN: NEGATIVE
PH UR STRIP: 6.5 [PH] (ref 5–8)
PLATELET # BLD AUTO: 229 K/UL (ref 138–453)
PMV BLD AUTO: 10.1 FL (ref 8.1–13.5)
POTASSIUM SERPL-SCNC: 4.4 MMOL/L (ref 3.7–5.3)
PROT UR STRIP-MCNC: ABNORMAL MG/DL
RBC # BLD AUTO: 2.88 M/UL (ref 4.21–5.77)
RBC #/AREA URNS HPF: NORMAL /HPF (ref 0–4)
SODIUM SERPL-SCNC: 140 MMOL/L (ref 136–145)
SP GR UR STRIP: 1.01 (ref 1–1.03)
TEST INFORMATION: ABNORMAL
TRIGL SERPL-MCNC: 73 MG/DL
UROBILINOGEN UR STRIP-ACNC: NORMAL EU/DL (ref 0–1)
VLDLC SERPL CALC-MCNC: 15 MG/DL (ref 1–30)
WBC #/AREA URNS HPF: NORMAL /HPF (ref 0–5)
WBC OTHER # BLD: 6.1 K/UL (ref 3.5–11.3)

## 2025-06-21 PROCEDURE — 83735 ASSAY OF MAGNESIUM: CPT

## 2025-06-21 PROCEDURE — 70450 CT HEAD/BRAIN W/O DYE: CPT

## 2025-06-21 PROCEDURE — 6370000000 HC RX 637 (ALT 250 FOR IP): Performed by: HOSPITALIST

## 2025-06-21 PROCEDURE — 85025 COMPLETE CBC W/AUTO DIFF WBC: CPT

## 2025-06-21 PROCEDURE — 99222 1ST HOSP IP/OBS MODERATE 55: CPT | Performed by: INTERNAL MEDICINE

## 2025-06-21 PROCEDURE — 99233 SBSQ HOSP IP/OBS HIGH 50: CPT | Performed by: HOSPITALIST

## 2025-06-21 PROCEDURE — 2500000003 HC RX 250 WO HCPCS

## 2025-06-21 PROCEDURE — 6360000002 HC RX W HCPCS

## 2025-06-21 PROCEDURE — 6370000000 HC RX 637 (ALT 250 FOR IP)

## 2025-06-21 PROCEDURE — 93005 ELECTROCARDIOGRAM TRACING: CPT

## 2025-06-21 PROCEDURE — 80307 DRUG TEST PRSMV CHEM ANLYZR: CPT

## 2025-06-21 PROCEDURE — 81001 URINALYSIS AUTO W/SCOPE: CPT

## 2025-06-21 PROCEDURE — 36415 COLL VENOUS BLD VENIPUNCTURE: CPT

## 2025-06-21 PROCEDURE — 80048 BASIC METABOLIC PNL TOTAL CA: CPT

## 2025-06-21 PROCEDURE — 2580000003 HC RX 258

## 2025-06-21 PROCEDURE — 2060000000 HC ICU INTERMEDIATE R&B

## 2025-06-21 PROCEDURE — 76770 US EXAM ABDO BACK WALL COMP: CPT

## 2025-06-21 PROCEDURE — 6370000000 HC RX 637 (ALT 250 FOR IP): Performed by: INTERNAL MEDICINE

## 2025-06-21 PROCEDURE — 80061 LIPID PANEL: CPT

## 2025-06-21 RX ORDER — HYDRALAZINE HYDROCHLORIDE 50 MG/1
100 TABLET, FILM COATED ORAL EVERY 8 HOURS SCHEDULED
Status: DISCONTINUED | OUTPATIENT
Start: 2025-06-21 | End: 2025-06-25 | Stop reason: HOSPADM

## 2025-06-21 RX ORDER — AMLODIPINE BESYLATE 5 MG/1
5 TABLET ORAL DAILY
Status: DISCONTINUED | OUTPATIENT
Start: 2025-06-21 | End: 2025-06-21

## 2025-06-21 RX ORDER — CARVEDILOL 25 MG/1
25 TABLET ORAL 2 TIMES DAILY WITH MEALS
Status: DISCONTINUED | OUTPATIENT
Start: 2025-06-21 | End: 2025-06-23

## 2025-06-21 RX ORDER — ISOSORBIDE MONONITRATE 30 MG/1
60 TABLET, EXTENDED RELEASE ORAL DAILY
Status: DISCONTINUED | OUTPATIENT
Start: 2025-06-22 | End: 2025-06-25 | Stop reason: HOSPADM

## 2025-06-21 RX ORDER — ISOSORBIDE MONONITRATE 30 MG/1
30 TABLET, EXTENDED RELEASE ORAL DAILY
Status: DISCONTINUED | OUTPATIENT
Start: 2025-06-21 | End: 2025-06-21

## 2025-06-21 RX ORDER — ASPIRIN 81 MG/1
81 TABLET ORAL DAILY
Status: DISCONTINUED | OUTPATIENT
Start: 2025-06-21 | End: 2025-06-25 | Stop reason: HOSPADM

## 2025-06-21 RX ORDER — AMLODIPINE BESYLATE 10 MG/1
10 TABLET ORAL DAILY
Status: DISCONTINUED | OUTPATIENT
Start: 2025-06-21 | End: 2025-06-24

## 2025-06-21 RX ORDER — HYDRALAZINE HYDROCHLORIDE 50 MG/1
50 TABLET, FILM COATED ORAL EVERY 8 HOURS SCHEDULED
Status: DISCONTINUED | OUTPATIENT
Start: 2025-06-21 | End: 2025-06-21

## 2025-06-21 RX ADMIN — NICARDIPINE HYDROCHLORIDE 12 MG/HR: 25 INJECTION INTRAVENOUS at 20:46

## 2025-06-21 RX ADMIN — HYDRALAZINE HYDROCHLORIDE 50 MG: 50 TABLET ORAL at 05:45

## 2025-06-21 RX ADMIN — HYDRALAZINE HYDROCHLORIDE 50 MG: 50 TABLET ORAL at 00:45

## 2025-06-21 RX ADMIN — ASPIRIN 81 MG: 81 TABLET, COATED ORAL at 11:36

## 2025-06-21 RX ADMIN — ATORVASTATIN CALCIUM 40 MG: 40 TABLET, FILM COATED ORAL at 20:35

## 2025-06-21 RX ADMIN — SODIUM CHLORIDE, PRESERVATIVE FREE 10 ML: 5 INJECTION INTRAVENOUS at 21:11

## 2025-06-21 RX ADMIN — NICARDIPINE HYDROCHLORIDE 9 MG/HR: 25 INJECTION INTRAVENOUS at 23:21

## 2025-06-21 RX ADMIN — NICARDIPINE HYDROCHLORIDE 13.5 MG/HR: 25 INJECTION INTRAVENOUS at 18:50

## 2025-06-21 RX ADMIN — TAMSULOSIN HYDROCHLORIDE 0.4 MG: 0.4 CAPSULE ORAL at 07:49

## 2025-06-21 RX ADMIN — NICARDIPINE HYDROCHLORIDE 13.5 MG/HR: 25 INJECTION INTRAVENOUS at 09:29

## 2025-06-21 RX ADMIN — ISOSORBIDE MONONITRATE 30 MG: 30 TABLET, EXTENDED RELEASE ORAL at 11:36

## 2025-06-21 RX ADMIN — HYDRALAZINE HYDROCHLORIDE 100 MG: 50 TABLET ORAL at 13:48

## 2025-06-21 RX ADMIN — AMLODIPINE BESYLATE 10 MG: 10 TABLET ORAL at 07:49

## 2025-06-21 RX ADMIN — NICARDIPINE HYDROCHLORIDE 13.5 MG/HR: 25 INJECTION INTRAVENOUS at 16:02

## 2025-06-21 RX ADMIN — HYDRALAZINE HYDROCHLORIDE 100 MG: 50 TABLET ORAL at 21:10

## 2025-06-21 RX ADMIN — NICARDIPINE HYDROCHLORIDE 13.5 MG/HR: 25 INJECTION INTRAVENOUS at 13:47

## 2025-06-21 RX ADMIN — CARVEDILOL 25 MG: 25 TABLET, FILM COATED ORAL at 18:44

## 2025-06-21 RX ADMIN — NICARDIPINE HYDROCHLORIDE 12 MG/HR: 25 INJECTION INTRAVENOUS at 01:32

## 2025-06-21 RX ADMIN — CARVEDILOL 25 MG: 25 TABLET, FILM COATED ORAL at 07:49

## 2025-06-21 ASSESSMENT — PAIN SCALES - GENERAL
PAINLEVEL_OUTOF10: 0

## 2025-06-21 NOTE — CARE COORDINATION
Case Management Assessment  Initial Evaluation    Date/Time of Evaluation: 6/21/2025 6:17 PM  Assessment Completed by: Ashley Alva RN    If patient is discharged prior to next notation, then this note serves as note for discharge by case management.    Patient Name: Maldonado Geiger                   YOB: 1960  Diagnosis: Essential hypertension [I10]  Hypertensive emergency [I16.1]                   Date / Time: 6/20/2025  2:31 PM    Patient Admission Status: Inpatient   Readmission Risk (Low < 19, Mod (19-27), High > 27): Readmission Risk Score: 13.4    Current PCP: Fabi Anders MD  PCP verified by CM? (P) Yes    Chart Reviewed: Yes      History Provided by: (P) Patient  Patient Orientation: (P) Alert and Oriented    Patient Cognition: (P) Alert    Hospitalization in the last 30 days (Readmission):  No    If yes, Readmission Assessment in CM Navigator will be completed.    Advance Directives:      Code Status: Full Code   Patient's Primary Decision Maker is: (P) Legal Next of Kin      Discharge Planning:    Patient lives with: (P) Spouse/Significant Other Type of Home: (P) House  Primary Care Giver: (P) Self  Patient Support Systems include: (P) Spouse/Significant Other   Current Financial resources: (P) Medicare  Current community resources:    Current services prior to admission: (P) None            Current DME:              Type of Home Care services:  (P) None    ADLS  Prior functional level: (P) Independent in ADLs/IADLs  Current functional level: (P) Independent in ADLs/IADLs    PT AM-PAC:   /24  OT AM-PAC:   /24    Family can provide assistance at DC: (P) Yes  Would you like Case Management to discuss the discharge plan with any other family members/significant others, and if so, who? (P) No  Plans to Return to Present Housing: (P) Yes  Other Identified Issues/Barriers to RETURNING to current housing: none  Potential Assistance needed at discharge: (P) N/A            Potential DME:

## 2025-06-21 NOTE — PLAN OF CARE
Problem: Chronic Conditions and Co-morbidities  Goal: Patient's chronic conditions and co-morbidity symptoms are monitored and maintained or improved  Outcome: Progressing  Flowsheets (Taken 6/21/2025 0445)  Care Plan - Patient's Chronic Conditions and Co-Morbidity Symptoms are Monitored and Maintained or Improved:   Monitor and assess patient's chronic conditions and comorbid symptoms for stability, deterioration, or improvement   Collaborate with multidisciplinary team to address chronic and comorbid conditions and prevent exacerbation or deterioration   Update acute care plan with appropriate goals if chronic or comorbid symptoms are exacerbated and prevent overall improvement and discharge     Problem: Discharge Planning  Goal: Discharge to home or other facility with appropriate resources  Outcome: Progressing  Flowsheets (Taken 6/21/2025 0445)  Discharge to home or other facility with appropriate resources:   Identify barriers to discharge with patient and caregiver   Arrange for needed discharge resources and transportation as appropriate   Identify discharge learning needs (meds, wound care, etc)   Refer to discharge planning if patient needs post-hospital services based on physician order or complex needs related to functional status, cognitive ability or social support system     Problem: Neurosensory - Adult  Goal: Achieves stable or improved neurological status  Outcome: Progressing  Flowsheets (Taken 6/21/2025 0445)  Achieves stable or improved neurological status: Monitor temperature, glucose, and sodium. Initiate appropriate interventions as ordered  Goal: Achieves maximal functionality and self care  Outcome: Progressing  Flowsheets (Taken 6/21/2025 0445)  Achieves maximal functionality and self care: Encourage and assist patient to increase activity and self care with guidance from physical therapy/occupational therapy     Problem: Cardiovascular - Adult  Goal: Maintains optimal cardiac output and

## 2025-06-22 ENCOUNTER — APPOINTMENT (OUTPATIENT)
Age: 65
DRG: 281 | End: 2025-06-22
Payer: MEDICARE

## 2025-06-22 PROBLEM — N18.32 STAGE 3B CHRONIC KIDNEY DISEASE (HCC): Status: ACTIVE | Noted: 2025-06-22

## 2025-06-22 LAB
ANION GAP SERPL CALCULATED.3IONS-SCNC: 10 MMOL/L (ref 9–16)
BASOPHILS # BLD: 0.05 K/UL (ref 0–0.2)
BASOPHILS NFR BLD: 1 % (ref 0–2)
BUN SERPL-MCNC: 30 MG/DL (ref 8–23)
CALCIUM SERPL-MCNC: 8.6 MG/DL (ref 8.6–10.4)
CHLORIDE SERPL-SCNC: 109 MMOL/L (ref 98–107)
CO2 SERPL-SCNC: 17 MMOL/L (ref 20–31)
CORTIS SERPL-MCNC: 12.4 UG/DL (ref 2.5–19.5)
CREAT SERPL-MCNC: 3 MG/DL (ref 0.7–1.2)
EKG ATRIAL RATE: 81 BPM
EKG ATRIAL RATE: 90 BPM
EKG ATRIAL RATE: 95 BPM
EKG P AXIS: 61 DEGREES
EKG P AXIS: 71 DEGREES
EKG P AXIS: 71 DEGREES
EKG P-R INTERVAL: 154 MS
EKG P-R INTERVAL: 154 MS
EKG P-R INTERVAL: 160 MS
EKG Q-T INTERVAL: 370 MS
EKG Q-T INTERVAL: 392 MS
EKG Q-T INTERVAL: 406 MS
EKG QRS DURATION: 84 MS
EKG QRS DURATION: 88 MS
EKG QRS DURATION: 90 MS
EKG QTC CALCULATION (BAZETT): 464 MS
EKG QTC CALCULATION (BAZETT): 471 MS
EKG QTC CALCULATION (BAZETT): 479 MS
EKG R AXIS: -31 DEGREES
EKG R AXIS: -34 DEGREES
EKG R AXIS: -34 DEGREES
EKG T AXIS: -77 DEGREES
EKG T AXIS: -87 DEGREES
EKG T AXIS: -87 DEGREES
EKG VENTRICULAR RATE: 81 BPM
EKG VENTRICULAR RATE: 90 BPM
EKG VENTRICULAR RATE: 95 BPM
EOSINOPHIL # BLD: 0.07 K/UL (ref 0–0.44)
EOSINOPHILS RELATIVE PERCENT: 1 % (ref 1–4)
ERYTHROCYTE [DISTWIDTH] IN BLOOD BY AUTOMATED COUNT: 12.7 % (ref 11.8–14.4)
GFR, ESTIMATED: 23 ML/MIN/1.73M2
GLUCOSE SERPL-MCNC: 108 MG/DL (ref 74–99)
HCT VFR BLD AUTO: 25.5 % (ref 40.7–50.3)
HGB BLD-MCNC: 8.4 G/DL (ref 13–17)
IMM GRANULOCYTES # BLD AUTO: <0.03 K/UL (ref 0–0.3)
IMM GRANULOCYTES NFR BLD: 0 %
LYMPHOCYTES NFR BLD: 0.96 K/UL (ref 1.1–3.7)
LYMPHOCYTES RELATIVE PERCENT: 16 % (ref 24–43)
MCH RBC QN AUTO: 33.1 PG (ref 25.2–33.5)
MCHC RBC AUTO-ENTMCNC: 32.9 G/DL (ref 28.4–34.8)
MCV RBC AUTO: 100.4 FL (ref 82.6–102.9)
MONOCYTES NFR BLD: 0.68 K/UL (ref 0.1–1.2)
MONOCYTES NFR BLD: 11 % (ref 3–12)
NEUTROPHILS NFR BLD: 71 % (ref 36–65)
NEUTS SEG NFR BLD: 4.35 K/UL (ref 1.5–8.1)
NRBC BLD-RTO: 0 PER 100 WBC
PLATELET # BLD AUTO: 209 K/UL (ref 138–453)
PMV BLD AUTO: 9.7 FL (ref 8.1–13.5)
POTASSIUM SERPL-SCNC: 4.7 MMOL/L (ref 3.7–5.3)
RBC # BLD AUTO: 2.54 M/UL (ref 4.21–5.77)
SODIUM SERPL-SCNC: 136 MMOL/L (ref 136–145)
WBC OTHER # BLD: 6.1 K/UL (ref 3.5–11.3)

## 2025-06-22 PROCEDURE — 6370000000 HC RX 637 (ALT 250 FOR IP)

## 2025-06-22 PROCEDURE — 2580000003 HC RX 258

## 2025-06-22 PROCEDURE — 99222 1ST HOSP IP/OBS MODERATE 55: CPT | Performed by: INTERNAL MEDICINE

## 2025-06-22 PROCEDURE — 82088 ASSAY OF ALDOSTERONE: CPT

## 2025-06-22 PROCEDURE — 2500000003 HC RX 250 WO HCPCS

## 2025-06-22 PROCEDURE — 80048 BASIC METABOLIC PNL TOTAL CA: CPT

## 2025-06-22 PROCEDURE — 36415 COLL VENOUS BLD VENIPUNCTURE: CPT

## 2025-06-22 PROCEDURE — 99232 SBSQ HOSP IP/OBS MODERATE 35: CPT | Performed by: INTERNAL MEDICINE

## 2025-06-22 PROCEDURE — 2060000000 HC ICU INTERMEDIATE R&B

## 2025-06-22 PROCEDURE — 82533 TOTAL CORTISOL: CPT

## 2025-06-22 PROCEDURE — 85025 COMPLETE CBC W/AUTO DIFF WBC: CPT

## 2025-06-22 PROCEDURE — 6360000002 HC RX W HCPCS

## 2025-06-22 PROCEDURE — 99232 SBSQ HOSP IP/OBS MODERATE 35: CPT | Performed by: HOSPITALIST

## 2025-06-22 PROCEDURE — 6370000000 HC RX 637 (ALT 250 FOR IP): Performed by: INTERNAL MEDICINE

## 2025-06-22 PROCEDURE — 84244 ASSAY OF RENIN: CPT

## 2025-06-22 PROCEDURE — 6370000000 HC RX 637 (ALT 250 FOR IP): Performed by: HOSPITALIST

## 2025-06-22 PROCEDURE — 74176 CT ABD & PELVIS W/O CONTRAST: CPT

## 2025-06-22 RX ORDER — CHLORTHALIDONE 25 MG/1
25 TABLET ORAL DAILY
Status: DISCONTINUED | OUTPATIENT
Start: 2025-06-22 | End: 2025-06-23

## 2025-06-22 RX ORDER — LAMOTRIGINE 25 MG/1
25 TABLET ORAL DAILY
Status: DISCONTINUED | OUTPATIENT
Start: 2025-06-22 | End: 2025-06-25 | Stop reason: HOSPADM

## 2025-06-22 RX ORDER — PHENYTOIN SODIUM 100 MG/1
100 CAPSULE, EXTENDED RELEASE ORAL 3 TIMES DAILY
Status: DISCONTINUED | OUTPATIENT
Start: 2025-06-22 | End: 2025-06-25 | Stop reason: HOSPADM

## 2025-06-22 RX ADMIN — NICARDIPINE HYDROCHLORIDE 12 MG/HR: 25 INJECTION INTRAVENOUS at 08:29

## 2025-06-22 RX ADMIN — LAMOTRIGINE 25 MG: 25 TABLET ORAL at 11:05

## 2025-06-22 RX ADMIN — ATORVASTATIN CALCIUM 40 MG: 40 TABLET, FILM COATED ORAL at 20:06

## 2025-06-22 RX ADMIN — NICARDIPINE HYDROCHLORIDE 12 MG/HR: 25 INJECTION INTRAVENOUS at 10:45

## 2025-06-22 RX ADMIN — CARVEDILOL 25 MG: 25 TABLET, FILM COATED ORAL at 08:26

## 2025-06-22 RX ADMIN — HYDRALAZINE HYDROCHLORIDE 100 MG: 50 TABLET ORAL at 20:06

## 2025-06-22 RX ADMIN — PHENYTOIN SODIUM 100 MG: 100 CAPSULE, EXTENDED RELEASE ORAL at 10:21

## 2025-06-22 RX ADMIN — NICARDIPINE HYDROCHLORIDE 8.5 MG/HR: 25 INJECTION INTRAVENOUS at 04:58

## 2025-06-22 RX ADMIN — NICARDIPINE HYDROCHLORIDE 9 MG/HR: 25 INJECTION INTRAVENOUS at 02:33

## 2025-06-22 RX ADMIN — NICARDIPINE HYDROCHLORIDE 6.5 MG/HR: 25 INJECTION INTRAVENOUS at 14:38

## 2025-06-22 RX ADMIN — AMLODIPINE BESYLATE 10 MG: 10 TABLET ORAL at 08:25

## 2025-06-22 RX ADMIN — HEPARIN SODIUM 5000 UNITS: 5000 INJECTION INTRAVENOUS; SUBCUTANEOUS at 20:06

## 2025-06-22 RX ADMIN — ISOSORBIDE MONONITRATE 60 MG: 30 TABLET, EXTENDED RELEASE ORAL at 08:25

## 2025-06-22 RX ADMIN — NICARDIPINE HYDROCHLORIDE 7.5 MG/HR: 25 INJECTION INTRAVENOUS at 18:39

## 2025-06-22 RX ADMIN — PHENYTOIN SODIUM 100 MG: 100 CAPSULE, EXTENDED RELEASE ORAL at 20:06

## 2025-06-22 RX ADMIN — ASPIRIN 81 MG: 81 TABLET, COATED ORAL at 08:26

## 2025-06-22 RX ADMIN — TAMSULOSIN HYDROCHLORIDE 0.4 MG: 0.4 CAPSULE ORAL at 08:26

## 2025-06-22 RX ADMIN — PHENYTOIN SODIUM 100 MG: 100 CAPSULE, EXTENDED RELEASE ORAL at 14:38

## 2025-06-22 RX ADMIN — SODIUM CHLORIDE, PRESERVATIVE FREE 10 ML: 5 INJECTION INTRAVENOUS at 20:06

## 2025-06-22 RX ADMIN — HYDRALAZINE HYDROCHLORIDE 100 MG: 50 TABLET ORAL at 14:38

## 2025-06-22 RX ADMIN — CHLORTHALIDONE 25 MG: 25 TABLET ORAL at 10:22

## 2025-06-22 RX ADMIN — HYDRALAZINE HYDROCHLORIDE 100 MG: 50 TABLET ORAL at 05:00

## 2025-06-22 RX ADMIN — CARVEDILOL 25 MG: 25 TABLET, FILM COATED ORAL at 18:26

## 2025-06-22 RX ADMIN — HEPARIN SODIUM 5000 UNITS: 5000 INJECTION INTRAVENOUS; SUBCUTANEOUS at 05:02

## 2025-06-22 ASSESSMENT — PAIN SCALES - GENERAL
PAINLEVEL_OUTOF10: 0
PAINLEVEL_OUTOF10: 0

## 2025-06-23 ENCOUNTER — APPOINTMENT (OUTPATIENT)
Age: 65
DRG: 281 | End: 2025-06-23
Payer: MEDICARE

## 2025-06-23 PROBLEM — I10 ACCELERATED HYPERTENSION: Status: ACTIVE | Noted: 2022-09-24

## 2025-06-23 PROBLEM — N18.4 STAGE 4 CHRONIC KIDNEY DISEASE (HCC): Status: ACTIVE | Noted: 2025-06-23

## 2025-06-23 LAB
ANION GAP SERPL CALCULATED.3IONS-SCNC: 13 MMOL/L (ref 9–16)
BASOPHILS # BLD: 0.05 K/UL (ref 0–0.2)
BASOPHILS NFR BLD: 1 % (ref 0–2)
BODY TEMPERATURE: 37
BUN SERPL-MCNC: 29 MG/DL (ref 8–23)
CA-I BLD-SCNC: 1.13 MMOL/L (ref 1.13–1.33)
CALCIUM SERPL-MCNC: 8.7 MG/DL (ref 8.6–10.4)
CHLORIDE SERPL-SCNC: 110 MMOL/L (ref 98–107)
CO2 SERPL-SCNC: 15 MMOL/L (ref 20–31)
COHGB MFR BLD: 0.3 % (ref 0–5)
CORTIS SERPL-MCNC: 11.6 UG/DL (ref 2.5–19.5)
CREAT SERPL-MCNC: 2.9 MG/DL (ref 0.7–1.2)
EOSINOPHIL # BLD: 0.15 K/UL (ref 0–0.44)
EOSINOPHILS RELATIVE PERCENT: 2 % (ref 1–4)
ERYTHROCYTE [DISTWIDTH] IN BLOOD BY AUTOMATED COUNT: 12.7 % (ref 11.8–14.4)
FERRITIN SERPL-MCNC: 219 NG/ML
FIO2 ON VENT: ABNORMAL %
FOLATE SERPL-MCNC: 12 NG/ML (ref 4.8–24.2)
GFR, ESTIMATED: 24 ML/MIN/1.73M2
GLUCOSE SERPL-MCNC: 95 MG/DL (ref 74–99)
HCO3 VENOUS: 18.5 MMOL/L (ref 24–30)
HCT VFR BLD AUTO: 26.8 % (ref 40.7–50.3)
HGB BLD-MCNC: 8.7 G/DL (ref 13–17)
IMM GRANULOCYTES # BLD AUTO: <0.03 K/UL (ref 0–0.3)
IMM GRANULOCYTES NFR BLD: 0 %
IMM RETICS NFR: 12.1 % (ref 2.7–18.3)
IRON SATN MFR SERPL: 19 % (ref 20–55)
IRON SERPL-MCNC: 31 UG/DL (ref 61–157)
LYMPHOCYTES NFR BLD: 1.17 K/UL (ref 1.1–3.7)
LYMPHOCYTES RELATIVE PERCENT: 18 % (ref 24–43)
MCH RBC QN AUTO: 33 PG (ref 25.2–33.5)
MCHC RBC AUTO-ENTMCNC: 32.5 G/DL (ref 28.4–34.8)
MCV RBC AUTO: 101.5 FL (ref 82.6–102.9)
MONOCYTES NFR BLD: 0.82 K/UL (ref 0.1–1.2)
MONOCYTES NFR BLD: 13 % (ref 3–12)
NEGATIVE BASE EXCESS, VEN: 4.5 MMOL/L (ref 0–2)
NEUTROPHILS NFR BLD: 66 % (ref 36–65)
NEUTS SEG NFR BLD: 4.34 K/UL (ref 1.5–8.1)
NRBC BLD-RTO: 0 PER 100 WBC
O2 SAT, VEN: 97.4 % (ref 60–85)
PCO2 VENOUS: 27 MM HG (ref 39–55)
PH VENOUS: 7.45 (ref 7.32–7.42)
PLATELET # BLD AUTO: 225 K/UL (ref 138–453)
PMV BLD AUTO: 10 FL (ref 8.1–13.5)
PO2 VENOUS: 89.8 MM HG (ref 30–50)
POTASSIUM SERPL-SCNC: 4.2 MMOL/L (ref 3.7–5.3)
PTH-INTACT SERPL-MCNC: 151 PG/ML (ref 17.9–58.6)
RBC # BLD AUTO: 2.64 M/UL (ref 4.21–5.77)
RETIC HEMOGLOBIN: 35.3 PG (ref 28.2–35.7)
RETICS # AUTO: 0.06 M/UL (ref 0.03–0.08)
RETICS/RBC NFR AUTO: 2.3 % (ref 0.5–1.9)
SODIUM SERPL-SCNC: 138 MMOL/L (ref 136–145)
TIBC SERPL-MCNC: 164 UG/DL (ref 250–450)
UNSATURATED IRON BINDING CAPACITY: 133 UG/DL (ref 112–347)
VIT B12 SERPL-MCNC: 411 PG/ML (ref 232–1245)
WBC OTHER # BLD: 6.5 K/UL (ref 3.5–11.3)

## 2025-06-23 PROCEDURE — 6360000002 HC RX W HCPCS

## 2025-06-23 PROCEDURE — 83970 ASSAY OF PARATHORMONE: CPT

## 2025-06-23 PROCEDURE — 85025 COMPLETE CBC W/AUTO DIFF WBC: CPT

## 2025-06-23 PROCEDURE — 86038 ANTINUCLEAR ANTIBODIES: CPT

## 2025-06-23 PROCEDURE — 36415 COLL VENOUS BLD VENIPUNCTURE: CPT

## 2025-06-23 PROCEDURE — 83835 ASSAY OF METANEPHRINES: CPT

## 2025-06-23 PROCEDURE — 83550 IRON BINDING TEST: CPT

## 2025-06-23 PROCEDURE — 6370000000 HC RX 637 (ALT 250 FOR IP)

## 2025-06-23 PROCEDURE — 6370000000 HC RX 637 (ALT 250 FOR IP): Performed by: INTERNAL MEDICINE

## 2025-06-23 PROCEDURE — 82330 ASSAY OF CALCIUM: CPT

## 2025-06-23 PROCEDURE — 86225 DNA ANTIBODY NATIVE: CPT

## 2025-06-23 PROCEDURE — 82533 TOTAL CORTISOL: CPT

## 2025-06-23 PROCEDURE — 2060000000 HC ICU INTERMEDIATE R&B

## 2025-06-23 PROCEDURE — 2500000003 HC RX 250 WO HCPCS

## 2025-06-23 PROCEDURE — 82607 VITAMIN B-12: CPT

## 2025-06-23 PROCEDURE — 82746 ASSAY OF FOLIC ACID SERUM: CPT

## 2025-06-23 PROCEDURE — 83540 ASSAY OF IRON: CPT

## 2025-06-23 PROCEDURE — 80048 BASIC METABOLIC PNL TOTAL CA: CPT

## 2025-06-23 PROCEDURE — 6370000000 HC RX 637 (ALT 250 FOR IP): Performed by: HOSPITALIST

## 2025-06-23 PROCEDURE — 2580000003 HC RX 258

## 2025-06-23 PROCEDURE — 99233 SBSQ HOSP IP/OBS HIGH 50: CPT | Performed by: INTERNAL MEDICINE

## 2025-06-23 PROCEDURE — 82805 BLOOD GASES W/O2 SATURATION: CPT

## 2025-06-23 PROCEDURE — 85045 AUTOMATED RETICULOCYTE COUNT: CPT

## 2025-06-23 PROCEDURE — 82728 ASSAY OF FERRITIN: CPT

## 2025-06-23 PROCEDURE — 99232 SBSQ HOSP IP/OBS MODERATE 35: CPT | Performed by: INTERNAL MEDICINE

## 2025-06-23 PROCEDURE — 51798 US URINE CAPACITY MEASURE: CPT

## 2025-06-23 RX ORDER — SODIUM BICARBONATE 650 MG/1
650 TABLET ORAL 4 TIMES DAILY
Status: DISCONTINUED | OUTPATIENT
Start: 2025-06-23 | End: 2025-06-25

## 2025-06-23 RX ORDER — CLONIDINE HYDROCHLORIDE 0.1 MG/1
0.1 TABLET ORAL 3 TIMES DAILY
Status: DISCONTINUED | OUTPATIENT
Start: 2025-06-23 | End: 2025-06-25

## 2025-06-23 RX ORDER — CHLORTHALIDONE 25 MG/1
50 TABLET ORAL DAILY
Status: DISCONTINUED | OUTPATIENT
Start: 2025-06-24 | End: 2025-06-24

## 2025-06-23 RX ORDER — SPIRONOLACTONE 25 MG/1
25 TABLET ORAL DAILY
Status: DISCONTINUED | OUTPATIENT
Start: 2025-06-23 | End: 2025-06-25 | Stop reason: HOSPADM

## 2025-06-23 RX ADMIN — NICARDIPINE HYDROCHLORIDE 5 MG/HR: 25 INJECTION INTRAVENOUS at 20:51

## 2025-06-23 RX ADMIN — HEPARIN SODIUM 5000 UNITS: 5000 INJECTION INTRAVENOUS; SUBCUTANEOUS at 12:32

## 2025-06-23 RX ADMIN — SODIUM BICARBONATE 650 MG: 650 TABLET ORAL at 12:32

## 2025-06-23 RX ADMIN — NICARDIPINE HYDROCHLORIDE 5 MG/HR: 25 INJECTION INTRAVENOUS at 10:05

## 2025-06-23 RX ADMIN — PHENYTOIN SODIUM 100 MG: 100 CAPSULE, EXTENDED RELEASE ORAL at 15:29

## 2025-06-23 RX ADMIN — CLONIDINE HYDROCHLORIDE 0.1 MG: 0.1 TABLET ORAL at 12:32

## 2025-06-23 RX ADMIN — HYDRALAZINE HYDROCHLORIDE 100 MG: 50 TABLET ORAL at 21:40

## 2025-06-23 RX ADMIN — SPIRONOLACTONE 25 MG: 25 TABLET, FILM COATED ORAL at 12:43

## 2025-06-23 RX ADMIN — LAMOTRIGINE 25 MG: 25 TABLET ORAL at 09:14

## 2025-06-23 RX ADMIN — SODIUM BICARBONATE 650 MG: 650 TABLET ORAL at 17:01

## 2025-06-23 RX ADMIN — ATORVASTATIN CALCIUM 40 MG: 40 TABLET, FILM COATED ORAL at 20:53

## 2025-06-23 RX ADMIN — HYDRALAZINE HYDROCHLORIDE 100 MG: 50 TABLET ORAL at 05:20

## 2025-06-23 RX ADMIN — HYDRALAZINE HYDROCHLORIDE 100 MG: 50 TABLET ORAL at 15:29

## 2025-06-23 RX ADMIN — CLONIDINE HYDROCHLORIDE 0.1 MG: 0.1 TABLET ORAL at 20:53

## 2025-06-23 RX ADMIN — NICARDIPINE HYDROCHLORIDE 7.5 MG/HR: 25 INJECTION INTRAVENOUS at 05:19

## 2025-06-23 RX ADMIN — SODIUM CHLORIDE, PRESERVATIVE FREE 10 ML: 5 INJECTION INTRAVENOUS at 20:54

## 2025-06-23 RX ADMIN — CARVEDILOL 37.5 MG: 25 TABLET, FILM COATED ORAL at 17:01

## 2025-06-23 RX ADMIN — ISOSORBIDE MONONITRATE 60 MG: 30 TABLET, EXTENDED RELEASE ORAL at 09:08

## 2025-06-23 RX ADMIN — PHENYTOIN SODIUM 100 MG: 100 CAPSULE, EXTENDED RELEASE ORAL at 09:08

## 2025-06-23 RX ADMIN — ASPIRIN 81 MG: 81 TABLET, COATED ORAL at 09:08

## 2025-06-23 RX ADMIN — NICARDIPINE HYDROCHLORIDE 5 MG/HR: 25 INJECTION INTRAVENOUS at 01:13

## 2025-06-23 RX ADMIN — CHLORTHALIDONE 25 MG: 25 TABLET ORAL at 09:16

## 2025-06-23 RX ADMIN — NICARDIPINE HYDROCHLORIDE 5 MG/HR: 25 INJECTION INTRAVENOUS at 15:38

## 2025-06-23 RX ADMIN — HEPARIN SODIUM 5000 UNITS: 5000 INJECTION INTRAVENOUS; SUBCUTANEOUS at 05:20

## 2025-06-23 RX ADMIN — SODIUM CHLORIDE, PRESERVATIVE FREE 10 ML: 5 INJECTION INTRAVENOUS at 09:10

## 2025-06-23 RX ADMIN — TAMSULOSIN HYDROCHLORIDE 0.4 MG: 0.4 CAPSULE ORAL at 09:08

## 2025-06-23 RX ADMIN — AMLODIPINE BESYLATE 10 MG: 10 TABLET ORAL at 09:08

## 2025-06-23 RX ADMIN — CARVEDILOL 25 MG: 25 TABLET, FILM COATED ORAL at 09:08

## 2025-06-23 RX ADMIN — HEPARIN SODIUM 5000 UNITS: 5000 INJECTION INTRAVENOUS; SUBCUTANEOUS at 20:59

## 2025-06-23 RX ADMIN — PHENYTOIN SODIUM 100 MG: 100 CAPSULE, EXTENDED RELEASE ORAL at 20:53

## 2025-06-23 RX ADMIN — SODIUM BICARBONATE 650 MG: 650 TABLET ORAL at 20:53

## 2025-06-23 ASSESSMENT — PAIN SCALES - GENERAL: PAINLEVEL_OUTOF10: 0

## 2025-06-23 NOTE — CARE COORDINATION
Case Management   Daily Progress Note       Patient Name: Maldonado Geiger                   YOB: 1960  Diagnosis: Essential hypertension [I10]  Hypertensive emergency [I16.1]                         days  Length of Stay: 3  days    Anticipated Discharge Date: Two or more days before discharge    Readmission Risk (Low < 19, Mod (19-27), High > 27): Readmission Risk Score: 14.6        Current Transitional Plan    [] Home Independently    [] Home with HC    [] Skilled Nursing Facility    [] Acute Rehabilitation    [] Long Term Acute Care (LTAC)    [] Other:     Plan for the Stay (Medical Management) : med changes          Workflow Continuation (Additional Notes) : plan is to return home independent denies any needs        Yun Chadwick RN  June 23, 2025

## 2025-06-23 NOTE — PLAN OF CARE
Problem: Chronic Conditions and Co-morbidities  Goal: Patient's chronic conditions and co-morbidity symptoms are monitored and maintained or improved  Outcome: Progressing     Problem: Discharge Planning  Goal: Discharge to home or other facility with appropriate resources  Outcome: Progressing     Problem: Neurosensory - Adult  Goal: Achieves stable or improved neurological status  Outcome: Progressing  Goal: Achieves maximal functionality and self care  Outcome: Progressing     Problem: Cardiovascular - Adult  Goal: Maintains optimal cardiac output and hemodynamic stability  Outcome: Progressing  Goal: Absence of cardiac dysrhythmias or at baseline  Outcome: Progressing     Problem: Pain  Goal: Verbalizes/displays adequate comfort level or baseline comfort level  Outcome: Progressing     Problem: Safety - Adult  Goal: Free from fall injury  Outcome: Progressing

## 2025-06-23 NOTE — CONSULTS
Cardiology Consultation   Deshawn Robbins MD [facility]  Aleshia Kuo MD [facility]  Emiliana Van, CNP      Reason for Consult: Uncontrolled hypertension and elevated troponin  Requesting Physician: Laci Burton*    CHIEF COMPLAINT: Headache, blurry vision and abdominal discomfort      HISTORY OF PRESENT ILLNESS:    This is a 64-year-old gentleman with history of essential hypertension, dyslipidemia, traumatic brain injury s/p craniotomy in 1998, CVA, seizure disorder, headaches, CKD with renal cancer s/p right nephrectomy, anemia of chronic disease, nicotine abuse and BPH presented to emergency room with complaints of headache, blurry vision and abdominal discomfort.  Patient denied any chest pain or shortness of breath.  Patient states that he has not been taking his medications for the past 4 months.    Patient was noted to be hypertensive and started on nicardipine drip in addition to amlodipine and hydralazine.  We have started carvedilol as well.  Chest x-ray showed interstitial pulmonary edema.  Patient has elevated proBNP level 5379.  He has mildly elevated troponin in nonspecific range 69 followed by 68.  EKG showed sinus rhythm.    We have discussed lifestyle changes with complete nicotine cessation and compliance with medications and diet.    Past Medical History:    Past Medical History:   Diagnosis Date    Confusion     Head injury     Headache     Hypertension     Stroke (cerebrum) (HCC)      Past Surgical History:    Past Surgical History:   Procedure Laterality Date    CT CRYOABLATION OF RENAL TUMOR  10/2/2018    CT CRYOABLATION OF RENAL TUMOR     Home Medications:  Prior to Admission medications    Medication Sig Start Date End Date Taking? Authorizing Provider   lamoTRIgine (LAMICTAL) 25 MG tablet Week 1 and 2: 25 mg/day; Weeks 3 and 4: 50 mg/day; Week 5: 50 mg BID, Week 6: 50 mg am and 100 mg HS, Week 7: 100 mg BID 6/19/25  Yes Grisleda Juarez MD   phenytoin (DILANTIN) 
  [facility]  Ranjeet Kim MD FACS    Consult    Patient:  Maldonado Geiger  MRN: [id number]  YOB: 1960    CHIEF COMPLAINT:  left renal lesion    HISTORY OF PRESENT ILLNESS:   The patient is a 64 y.o. male who presents with HTN emergency. He has history of solitary kidney 2/2 R nephrectomy due to RCC in 1998. Patient may have also had ablation of L renal lesion subsequently. Records of both are not available for review. Patient does have CKD 2/2 HTN, hx TBI CVA and seizure disorder. Urology consulted    Patient's old records, notes and chart reviewed and summarized above.     Past Medical History:    Past Medical History:   Diagnosis Date    Confusion     Head injury     Headache     Hypertension     Stroke (cerebrum) (HCC)        Past Surgical History:    Past Surgical History:   Procedure Laterality Date    CT CRYOABLATION OF RENAL TUMOR  10/2/2018    CT CRYOABLATION OF RENAL TUMOR     Previous Urologic Surgery: R nephrectomy, possible prior L ablation procedure  Medications Prior to Admission:    Prior to Admission medications    Medication Sig Start Date End Date Taking? Authorizing Provider   lamoTRIgine (LAMICTAL) 25 MG tablet Week 1 and 2: 25 mg/day; Weeks 3 and 4: 50 mg/day; Week 5: 50 mg BID, Week 6: 50 mg am and 100 mg HS, Week 7: 100 mg BID 6/19/25  Yes Griselda Juarez MD   phenytoin (DILANTIN) 100 MG ER capsule Take 1 capsule by mouth 3 times daily 5/22/25 6/21/25 Yes Griselda Juarez MD   rosuvastatin (CRESTOR) 10 MG tablet Take 1 tablet by mouth every morning 2/15/23  Yes ProviderKhadra MD   sodium bicarbonate 650 MG tablet  3/23/23  Yes ProviderKhadra MD   vitamin D3 (CHOLECALCIFEROL) 25 MCG (1000 UT) TABS tablet Take 1 tablet by mouth daily 11/2/22  Yes Griselda Juarez MD   atorvastatin (LIPITOR) 40 MG tablet Take 1 tablet by mouth nightly 9/28/22  Yes Bulmaro Fields MD   cloNIDine (CATAPRES) 0.1 MG/24HR PTWK Place 1 patch onto the skin once a 
Insecurity: No Food Insecurity (6/20/2025)    Hunger Vital Sign     Worried About Running Out of Food in the Last Year: Never true     Ran Out of Food in the Last Year: Never true   Transportation Needs: No Transportation Needs (6/20/2025)    PRAPARE - Transportation     Lack of Transportation (Medical): No     Lack of Transportation (Non-Medical): No   Physical Activity: Not on file   Stress: Not on file   Social Connections: Not on file   Intimate Partner Violence: Unknown (2/22/2024)    Received from [facility]     Fear of Current or Ex-Partner: Not on file     Emotionally Abused: Not on file     Physically Abused: Not on file     Sexually Abused: Not on file     Physically or Sexually Abused: Not on file   Housing Stability: Low Risk  (6/20/2025)    Housing Stability Vital Sign     Unable to Pay for Housing in the Last Year: No     Number of Times Moved in the Last Year: 0     Homeless in the Last Year: No       Family History:        Family History   Problem Relation Age of Onset    Hypertension Mother     Hypertension Father        Outpatient Medications:     Medications Prior to Admission: lamoTRIgine (LAMICTAL) 25 MG tablet, Week 1 and 2: 25 mg/day; Weeks 3 and 4: 50 mg/day; Week 5: 50 mg BID, Week 6: 50 mg am and 100 mg HS, Week 7: 100 mg BID  phenytoin (DILANTIN) 100 MG ER capsule, Take 1 capsule by mouth 3 times daily  rosuvastatin (CRESTOR) 10 MG tablet, Take 1 tablet by mouth every morning  sodium bicarbonate 650 MG tablet,   vitamin D3 (CHOLECALCIFEROL) 25 MCG (1000 UT) TABS tablet, Take 1 tablet by mouth daily  atorvastatin (LIPITOR) 40 MG tablet, Take 1 tablet by mouth nightly  cloNIDine (CATAPRES) 0.1 MG/24HR PTWK, Place 1 patch onto the skin once a week  hydrALAZINE (APRESOLINE) 100 MG tablet, Take 1 tablet by mouth every 8 hours  labetalol (NORMODYNE) 100 MG tablet, Take 2 tablets by mouth in the morning, at noon, and at bedtime  tamsulosin (FLOMAX) 0.4 MG

## 2025-06-24 ENCOUNTER — APPOINTMENT (OUTPATIENT)
Dept: VASCULAR LAB | Age: 65
DRG: 281 | End: 2025-06-24
Payer: MEDICARE

## 2025-06-24 ENCOUNTER — APPOINTMENT (OUTPATIENT)
Age: 65
DRG: 281 | End: 2025-06-24
Payer: MEDICARE

## 2025-06-24 ENCOUNTER — RESULTS FOLLOW-UP (OUTPATIENT)
Dept: INTERNAL MEDICINE CLINIC | Age: 65
End: 2025-06-24

## 2025-06-24 LAB
25(OH)D3 SERPL-MCNC: 6.4 NG/ML (ref 30–100)
ANA SER QL IA: NEGATIVE
ANION GAP SERPL CALCULATED.3IONS-SCNC: 11 MMOL/L (ref 9–16)
BACTERIA URNS QL MICRO: ABNORMAL
BASOPHILS # BLD: 0.05 K/UL (ref 0–0.2)
BASOPHILS NFR BLD: 1 % (ref 0–2)
BILIRUB UR QL STRIP: NEGATIVE
BUN SERPL-MCNC: 30 MG/DL (ref 8–23)
CALCIUM SERPL-MCNC: 8.8 MG/DL (ref 8.6–10.4)
CASTS #/AREA URNS LPF: ABNORMAL /LPF (ref 0–8)
CHLORIDE SERPL-SCNC: 111 MMOL/L (ref 98–107)
CLARITY UR: CLEAR
CO2 SERPL-SCNC: 17 MMOL/L (ref 20–31)
COLOR UR: YELLOW
CREAT SERPL-MCNC: 3.4 MG/DL (ref 0.7–1.2)
CREAT UR-MCNC: 93.4 MG/DL (ref 39–259)
DSDNA IGG SER QL IA: 0.8 IU/ML
ECHO AO ASC DIAM: 2.9 CM
ECHO AO ASCENDING AORTA INDEX: 1.45 CM/M2
ECHO AO ROOT DIAM: 3.9 CM
ECHO AO ROOT INDEX: 1.95 CM/M2
ECHO AV AREA PEAK VELOCITY: 3.2 CM2
ECHO AV AREA VTI: 2.9 CM2
ECHO AV AREA/BSA PEAK VELOCITY: 1.6 CM2/M2
ECHO AV AREA/BSA VTI: 1.5 CM2/M2
ECHO AV MEAN GRADIENT: 3 MMHG
ECHO AV MEAN VELOCITY: 0.8 M/S
ECHO AV PEAK GRADIENT: 5 MMHG
ECHO AV PEAK VELOCITY: 1.1 M/S
ECHO AV VELOCITY RATIO: 0.73
ECHO AV VTI: 24.3 CM
ECHO BSA: 1.95 M2
ECHO BSA: 1.95 M2
ECHO EST RA PRESSURE: 3 MMHG
ECHO IVC PROX: 1.7 CM
ECHO LA AREA 2C: 21.2 CM2
ECHO LA AREA 4C: 18.9 CM2
ECHO LA DIAMETER INDEX: 1.6 CM/M2
ECHO LA DIAMETER: 3.2 CM
ECHO LA MAJOR AXIS: 5.6 CM
ECHO LA MINOR AXIS: 5.7 CM
ECHO LA TO AORTIC ROOT RATIO: 0.82
ECHO LA VOL BP: 58 ML (ref 18–58)
ECHO LA VOL MOD A2C: 64 ML (ref 18–58)
ECHO LA VOL MOD A4C: 51 ML (ref 18–58)
ECHO LA VOL/BSA BIPLANE: 29 ML/M2 (ref 16–34)
ECHO LA VOLUME INDEX MOD A2C: 32 ML/M2 (ref 16–34)
ECHO LA VOLUME INDEX MOD A4C: 26 ML/M2 (ref 16–34)
ECHO LV E' LATERAL VELOCITY: 6.85 CM/S
ECHO LV E' SEPTAL VELOCITY: 6.31 CM/S
ECHO LV EDV A2C: 112 ML
ECHO LV EDV A4C: 86 ML
ECHO LV EDV INDEX A4C: 43 ML/M2
ECHO LV EDV NDEX A2C: 56 ML/M2
ECHO LV EF PHYSICIAN: 39 %
ECHO LV EJECTION FRACTION A2C: 36 %
ECHO LV EJECTION FRACTION A4C: 46 %
ECHO LV EJECTION FRACTION BIPLANE: 39 % (ref 55–100)
ECHO LV ESV A2C: 72 ML
ECHO LV ESV A4C: 47 ML
ECHO LV ESV INDEX A2C: 36 ML/M2
ECHO LV ESV INDEX A4C: 24 ML/M2
ECHO LV FRACTIONAL SHORTENING: 27 % (ref 28–44)
ECHO LV INTERNAL DIMENSION DIASTOLE INDEX: 2.75 CM/M2
ECHO LV INTERNAL DIMENSION DIASTOLIC: 5.5 CM (ref 4.2–5.9)
ECHO LV INTERNAL DIMENSION SYSTOLIC INDEX: 2 CM/M2
ECHO LV INTERNAL DIMENSION SYSTOLIC: 4 CM
ECHO LV IVSD: 1.1 CM (ref 0.6–1)
ECHO LV MASS 2D: 304.3 G (ref 88–224)
ECHO LV MASS INDEX 2D: 152.2 G/M2 (ref 49–115)
ECHO LV POSTERIOR WALL DIASTOLIC: 1.5 CM (ref 0.6–1)
ECHO LV RELATIVE WALL THICKNESS RATIO: 0.55
ECHO LVOT AREA: 4.5 CM2
ECHO LVOT AV VTI INDEX: 0.63
ECHO LVOT DIAM: 2.4 CM
ECHO LVOT MEAN GRADIENT: 1 MMHG
ECHO LVOT PEAK GRADIENT: 2 MMHG
ECHO LVOT PEAK VELOCITY: 0.8 M/S
ECHO LVOT STROKE VOLUME INDEX: 34.8 ML/M2
ECHO LVOT SV: 69.6 ML
ECHO LVOT VTI: 15.4 CM
ECHO MV A VELOCITY: 0.96 M/S
ECHO MV AREA VTI: 2.5 CM2
ECHO MV E DECELERATION TIME (DT): 199 MS
ECHO MV E VELOCITY: 1.07 M/S
ECHO MV E/A RATIO: 1.11
ECHO MV E/E' LATERAL: 15.62
ECHO MV E/E' RATIO (AVERAGED): 16.29
ECHO MV E/E' SEPTAL: 16.96
ECHO MV LVOT VTI INDEX: 1.78
ECHO MV MAX VELOCITY: 1.1 M/S
ECHO MV MEAN GRADIENT: 2 MMHG
ECHO MV MEAN VELOCITY: 0.7 M/S
ECHO MV PEAK GRADIENT: 5 MMHG
ECHO MV VTI: 27.4 CM
ECHO PV MAX VELOCITY: 0.8 M/S
ECHO PV PEAK GRADIENT: 3 MMHG
ECHO RIGHT VENTRICULAR SYSTOLIC PRESSURE (RVSP): 29 MMHG
ECHO RV BASAL DIMENSION: 3 CM
ECHO RV FREE WALL PEAK S': 17.6 CM/S
ECHO RV MID DIMENSION: 2.2 CM
ECHO RV TAPSE: 3.2 CM (ref 1.7–?)
ECHO TV REGURGITANT MAX VELOCITY: 2.56 M/S
ECHO TV REGURGITANT PEAK GRADIENT: 26 MMHG
EOSINOPHIL # BLD: 0.19 K/UL (ref 0–0.44)
EOSINOPHILS RELATIVE PERCENT: 3 % (ref 1–4)
EPI CELLS #/AREA URNS HPF: ABNORMAL /HPF (ref 0–5)
ERYTHROCYTE [DISTWIDTH] IN BLOOD BY AUTOMATED COUNT: 12.6 % (ref 11.8–14.4)
GFR, ESTIMATED: 19 ML/MIN/1.73M2
GLUCOSE SERPL-MCNC: 88 MG/DL (ref 74–99)
GLUCOSE UR STRIP-MCNC: NEGATIVE MG/DL
HCT VFR BLD AUTO: 26.3 % (ref 40.7–50.3)
HGB BLD-MCNC: 8.5 G/DL (ref 13–17)
HGB UR QL STRIP.AUTO: NEGATIVE
IMM GRANULOCYTES # BLD AUTO: <0.03 K/UL (ref 0–0.3)
IMM GRANULOCYTES NFR BLD: 0 %
KETONES UR STRIP-MCNC: NEGATIVE MG/DL
LEUKOCYTE ESTERASE UR QL STRIP: NEGATIVE
LYMPHOCYTES NFR BLD: 1.45 K/UL (ref 1.1–3.7)
LYMPHOCYTES RELATIVE PERCENT: 22 % (ref 24–43)
MCH RBC QN AUTO: 32.7 PG (ref 25.2–33.5)
MCHC RBC AUTO-ENTMCNC: 32.3 G/DL (ref 28.4–34.8)
MCV RBC AUTO: 101.2 FL (ref 82.6–102.9)
MONOCYTES NFR BLD: 0.86 K/UL (ref 0.1–1.2)
MONOCYTES NFR BLD: 13 % (ref 3–12)
NEUTROPHILS NFR BLD: 61 % (ref 36–65)
NEUTS SEG NFR BLD: 3.99 K/UL (ref 1.5–8.1)
NITRITE UR QL STRIP: NEGATIVE
NRBC BLD-RTO: 0 PER 100 WBC
NUCLEAR IGG SER IA-RTO: 0.3 U/ML
PH UR STRIP: 7.5 [PH] (ref 5–8)
PHOSPHATE SERPL-MCNC: 3.3 MG/DL (ref 2.5–4.5)
PLATELET # BLD AUTO: 203 K/UL (ref 138–453)
PMV BLD AUTO: 9.9 FL (ref 8.1–13.5)
POTASSIUM SERPL-SCNC: 4.5 MMOL/L (ref 3.7–5.3)
PROT UR STRIP-MCNC: ABNORMAL MG/DL
RBC # BLD AUTO: 2.6 M/UL (ref 4.21–5.77)
RBC #/AREA URNS HPF: ABNORMAL /HPF (ref 0–4)
SODIUM SERPL-SCNC: 139 MMOL/L (ref 136–145)
SP GR UR STRIP: 1.01 (ref 1–1.03)
TOTAL PROTEIN, URINE: 37 MG/DL
URINE TOTAL PROTEIN CREATININE RATIO: 0.4 (ref 0–0.2)
UROBILINOGEN UR STRIP-ACNC: NORMAL EU/DL (ref 0–1)
VAS AORTA SUPRARENAL PSV: 90.1 CM/S
VAS L RENAL ORIG RI: 0.85
VAS LEFT KIDNEY LENGTH: 11.61 CM
VAS LEFT RENAL DIST EDV: 11.6 CM/S
VAS LEFT RENAL DIST PSV: 59.4 CM/S
VAS LEFT RENAL DIST RI: 0.8
VAS LEFT RENAL MID EDV: 15.8 CM/S
VAS LEFT RENAL MID PSV: 122 CM/S
VAS LEFT RENAL MID RI: 0.87
VAS LEFT RENAL ORIGIN EDV: 16.8 CM/S
VAS LEFT RENAL ORIGIN PSV: 115 CM/S
VAS LEFT RENAL PROX EDV: 21.3 CM/S
VAS LEFT RENAL PROX PSV: 135 CM/S
VAS LEFT RENAL PROX RI: 0.84
WBC #/AREA URNS HPF: ABNORMAL /HPF (ref 0–5)
WBC OTHER # BLD: 6.6 K/UL (ref 3.5–11.3)

## 2025-06-24 PROCEDURE — 84100 ASSAY OF PHOSPHORUS: CPT

## 2025-06-24 PROCEDURE — 82570 ASSAY OF URINE CREATININE: CPT

## 2025-06-24 PROCEDURE — 2060000000 HC ICU INTERMEDIATE R&B

## 2025-06-24 PROCEDURE — 94761 N-INVAS EAR/PLS OXIMETRY MLT: CPT

## 2025-06-24 PROCEDURE — 6370000000 HC RX 637 (ALT 250 FOR IP): Performed by: INTERNAL MEDICINE

## 2025-06-24 PROCEDURE — 6370000000 HC RX 637 (ALT 250 FOR IP)

## 2025-06-24 PROCEDURE — 84156 ASSAY OF PROTEIN URINE: CPT

## 2025-06-24 PROCEDURE — 99232 SBSQ HOSP IP/OBS MODERATE 35: CPT | Performed by: INTERNAL MEDICINE

## 2025-06-24 PROCEDURE — 6370000000 HC RX 637 (ALT 250 FOR IP): Performed by: HOSPITALIST

## 2025-06-24 PROCEDURE — 93306 TTE W/DOPPLER COMPLETE: CPT

## 2025-06-24 PROCEDURE — 2500000003 HC RX 250 WO HCPCS

## 2025-06-24 PROCEDURE — 93306 TTE W/DOPPLER COMPLETE: CPT | Performed by: INTERNAL MEDICINE

## 2025-06-24 PROCEDURE — 6360000002 HC RX W HCPCS

## 2025-06-24 PROCEDURE — 51798 US URINE CAPACITY MEASURE: CPT

## 2025-06-24 PROCEDURE — 80048 BASIC METABOLIC PNL TOTAL CA: CPT

## 2025-06-24 PROCEDURE — 93975 VASCULAR STUDY: CPT

## 2025-06-24 PROCEDURE — 99233 SBSQ HOSP IP/OBS HIGH 50: CPT | Performed by: INTERNAL MEDICINE

## 2025-06-24 PROCEDURE — 85025 COMPLETE CBC W/AUTO DIFF WBC: CPT

## 2025-06-24 PROCEDURE — 82306 VITAMIN D 25 HYDROXY: CPT

## 2025-06-24 PROCEDURE — 2580000003 HC RX 258

## 2025-06-24 PROCEDURE — 81001 URINALYSIS AUTO W/SCOPE: CPT

## 2025-06-24 PROCEDURE — 93975 VASCULAR STUDY: CPT | Performed by: SURGERY

## 2025-06-24 PROCEDURE — 36415 COLL VENOUS BLD VENIPUNCTURE: CPT

## 2025-06-24 RX ORDER — FERROUS SULFATE 325(65) MG
325 TABLET, DELAYED RELEASE (ENTERIC COATED) ORAL
Status: DISCONTINUED | OUTPATIENT
Start: 2025-06-25 | End: 2025-06-25 | Stop reason: HOSPADM

## 2025-06-24 RX ORDER — CHLORTHALIDONE 25 MG/1
25 TABLET ORAL DAILY
Status: DISCONTINUED | OUTPATIENT
Start: 2025-06-25 | End: 2025-06-25 | Stop reason: HOSPADM

## 2025-06-24 RX ORDER — NIFEDIPINE 30 MG/1
90 TABLET, EXTENDED RELEASE ORAL DAILY
Status: DISCONTINUED | OUTPATIENT
Start: 2025-06-24 | End: 2025-06-25 | Stop reason: HOSPADM

## 2025-06-24 RX ORDER — ERGOCALCIFEROL 1.25 MG/1
50000 CAPSULE, LIQUID FILLED ORAL WEEKLY
Status: DISCONTINUED | OUTPATIENT
Start: 2025-06-24 | End: 2025-06-25 | Stop reason: HOSPADM

## 2025-06-24 RX ORDER — DOXAZOSIN 2 MG/1
2 TABLET ORAL DAILY
Status: DISCONTINUED | OUTPATIENT
Start: 2025-06-24 | End: 2025-06-25 | Stop reason: HOSPADM

## 2025-06-24 RX ADMIN — CLONIDINE HYDROCHLORIDE 0.1 MG: 0.1 TABLET ORAL at 14:14

## 2025-06-24 RX ADMIN — HEPARIN SODIUM 5000 UNITS: 5000 INJECTION INTRAVENOUS; SUBCUTANEOUS at 05:57

## 2025-06-24 RX ADMIN — NIFEDIPINE 90 MG: 30 TABLET, FILM COATED, EXTENDED RELEASE ORAL at 08:08

## 2025-06-24 RX ADMIN — SODIUM BICARBONATE 650 MG: 650 TABLET ORAL at 20:16

## 2025-06-24 RX ADMIN — HYDRALAZINE HYDROCHLORIDE 100 MG: 50 TABLET ORAL at 14:14

## 2025-06-24 RX ADMIN — SPIRONOLACTONE 25 MG: 25 TABLET, FILM COATED ORAL at 08:08

## 2025-06-24 RX ADMIN — CLONIDINE HYDROCHLORIDE 0.1 MG: 0.1 TABLET ORAL at 08:08

## 2025-06-24 RX ADMIN — SODIUM CHLORIDE, PRESERVATIVE FREE 10 ML: 5 INJECTION INTRAVENOUS at 20:18

## 2025-06-24 RX ADMIN — CLONIDINE HYDROCHLORIDE 0.1 MG: 0.1 TABLET ORAL at 20:16

## 2025-06-24 RX ADMIN — TAMSULOSIN HYDROCHLORIDE 0.4 MG: 0.4 CAPSULE ORAL at 08:09

## 2025-06-24 RX ADMIN — CARVEDILOL 37.5 MG: 25 TABLET, FILM COATED ORAL at 08:08

## 2025-06-24 RX ADMIN — SODIUM BICARBONATE 650 MG: 650 TABLET ORAL at 08:08

## 2025-06-24 RX ADMIN — HEPARIN SODIUM 5000 UNITS: 5000 INJECTION INTRAVENOUS; SUBCUTANEOUS at 20:21

## 2025-06-24 RX ADMIN — ISOSORBIDE MONONITRATE 60 MG: 30 TABLET, EXTENDED RELEASE ORAL at 08:08

## 2025-06-24 RX ADMIN — ERGOCALCIFEROL 50000 UNITS: 1.25 CAPSULE ORAL at 09:44

## 2025-06-24 RX ADMIN — NICARDIPINE HYDROCHLORIDE 5 MG/HR: 25 INJECTION INTRAVENOUS at 09:42

## 2025-06-24 RX ADMIN — SODIUM BICARBONATE 650 MG: 650 TABLET ORAL at 16:55

## 2025-06-24 RX ADMIN — HYDRALAZINE HYDROCHLORIDE 100 MG: 50 TABLET ORAL at 05:56

## 2025-06-24 RX ADMIN — HYDRALAZINE HYDROCHLORIDE 100 MG: 50 TABLET ORAL at 21:15

## 2025-06-24 RX ADMIN — PHENYTOIN SODIUM 100 MG: 100 CAPSULE, EXTENDED RELEASE ORAL at 14:14

## 2025-06-24 RX ADMIN — CHLORTHALIDONE 50 MG: 25 TABLET ORAL at 08:08

## 2025-06-24 RX ADMIN — HEPARIN SODIUM 5000 UNITS: 5000 INJECTION INTRAVENOUS; SUBCUTANEOUS at 14:14

## 2025-06-24 RX ADMIN — SODIUM BICARBONATE 650 MG: 650 TABLET ORAL at 14:13

## 2025-06-24 RX ADMIN — PHENYTOIN SODIUM 100 MG: 100 CAPSULE, EXTENDED RELEASE ORAL at 08:08

## 2025-06-24 RX ADMIN — PHENYTOIN SODIUM 100 MG: 100 CAPSULE, EXTENDED RELEASE ORAL at 20:16

## 2025-06-24 RX ADMIN — SODIUM CHLORIDE, PRESERVATIVE FREE 10 ML: 5 INJECTION INTRAVENOUS at 08:09

## 2025-06-24 RX ADMIN — ATORVASTATIN CALCIUM 40 MG: 40 TABLET, FILM COATED ORAL at 20:16

## 2025-06-24 RX ADMIN — ASPIRIN 81 MG: 81 TABLET, COATED ORAL at 08:08

## 2025-06-24 RX ADMIN — NICARDIPINE HYDROCHLORIDE 3 MG/HR: 25 INJECTION INTRAVENOUS at 02:32

## 2025-06-24 RX ADMIN — LAMOTRIGINE 25 MG: 25 TABLET ORAL at 08:08

## 2025-06-24 RX ADMIN — CARVEDILOL 37.5 MG: 25 TABLET, FILM COATED ORAL at 16:55

## 2025-06-24 ASSESSMENT — PAIN SCALES - GENERAL
PAINLEVEL_OUTOF10: 0

## 2025-06-24 NOTE — PLAN OF CARE
Problem: Chronic Conditions and Co-morbidities  Goal: Patient's chronic conditions and co-morbidity symptoms are monitored and maintained or improved  6/23/2025 2144 by Jultia Wills RN  Outcome: Progressing  6/23/2025 1804 by Ashleigh Powers RN  Outcome: Progressing     Problem: Discharge Planning  Goal: Discharge to home or other facility with appropriate resources  6/23/2025 2144 by Julita Wills RN  Outcome: Progressing  6/23/2025 1804 by Ashleigh Powers RN  Outcome: Progressing     Problem: Neurosensory - Adult  Goal: Achieves stable or improved neurological status  6/23/2025 2144 by Julita Wills RN  Outcome: Progressing  6/23/2025 1804 by Ashleigh Powers RN  Outcome: Progressing  Goal: Achieves maximal functionality and self care  6/23/2025 2144 by Julita Wills RN  Outcome: Progressing  6/23/2025 1804 by Ashleigh Powers RN  Outcome: Progressing     Problem: Cardiovascular - Adult  Goal: Maintains optimal cardiac output and hemodynamic stability  6/23/2025 2144 by Julita Wills RN  Outcome: Progressing  6/23/2025 1804 by Ashleigh Powers RN  Outcome: Progressing  Goal: Absence of cardiac dysrhythmias or at baseline  6/23/2025 2144 by Julita Wills RN  Outcome: Progressing  6/23/2025 1804 by Ashleigh Powers RN  Outcome: Progressing     Problem: Pain  Goal: Verbalizes/displays adequate comfort level or baseline comfort level  6/23/2025 2144 by Julita Wills RN  Outcome: Progressing  6/23/2025 1804 by Ashleigh Powers RN  Outcome: Progressing     Problem: Safety - Adult  Goal: Free from fall injury  6/23/2025 2144 by Julita Wills RN  Outcome: Progressing  6/23/2025 1804 by Ashleigh Powers RN  Outcome: Progressing     Problem: Nutrition Deficit:  Goal: Optimize nutritional status  6/23/2025 2144 by Juilta Wills RN  Outcome: Progressing  6/23/2025 1804 by Ashleigh Powers RN  Outcome: Progressing

## 2025-06-24 NOTE — PLAN OF CARE
Problem: Chronic Conditions and Co-morbidities  Goal: Patient's chronic conditions and co-morbidity symptoms are monitored and maintained or improved  Outcome: Progressing     Problem: Discharge Planning  Goal: Discharge to home or other facility with appropriate resources  Outcome: Progressing     Problem: Neurosensory - Adult  Goal: Achieves stable or improved neurological status  Outcome: Progressing  Goal: Achieves maximal functionality and self care  Outcome: Progressing     Problem: Cardiovascular - Adult  Goal: Maintains optimal cardiac output and hemodynamic stability  Outcome: Progressing  Goal: Absence of cardiac dysrhythmias or at baseline  Outcome: Progressing     Problem: Pain  Goal: Verbalizes/displays adequate comfort level or baseline comfort level  Outcome: Progressing     Problem: Safety - Adult  Goal: Free from fall injury  Outcome: Progressing     Problem: Nutrition Deficit:  Goal: Optimize nutritional status  Outcome: Progressing

## 2025-06-25 VITALS
HEIGHT: 76 IN | BODY MASS INDEX: 19.12 KG/M2 | TEMPERATURE: 98.6 F | DIASTOLIC BLOOD PRESSURE: 86 MMHG | OXYGEN SATURATION: 99 % | HEART RATE: 75 BPM | RESPIRATION RATE: 19 BRPM | WEIGHT: 157 LBS | SYSTOLIC BLOOD PRESSURE: 146 MMHG

## 2025-06-25 PROBLEM — I21.4 NSTEMI (NON-ST ELEVATED MYOCARDIAL INFARCTION) (HCC): Status: ACTIVE | Noted: 2025-06-25

## 2025-06-25 LAB
ALDOST SERPL-MCNC: 3.8 NG/DL
ANION GAP SERPL CALCULATED.3IONS-SCNC: 12 MMOL/L (ref 9–16)
BASOPHILS # BLD: 0.04 K/UL (ref 0–0.2)
BASOPHILS NFR BLD: 1 % (ref 0–2)
BUN SERPL-MCNC: 27 MG/DL (ref 8–23)
CALCIUM SERPL-MCNC: 8.8 MG/DL (ref 8.6–10.4)
CHLORIDE SERPL-SCNC: 108 MMOL/L (ref 98–107)
CO2 SERPL-SCNC: 17 MMOL/L (ref 20–31)
CREAT SERPL-MCNC: 3.2 MG/DL (ref 0.7–1.2)
EOSINOPHIL # BLD: 0.2 K/UL (ref 0–0.44)
EOSINOPHILS RELATIVE PERCENT: 4 % (ref 1–4)
ERYTHROCYTE [DISTWIDTH] IN BLOOD BY AUTOMATED COUNT: 12.5 % (ref 11.8–14.4)
GFR, ESTIMATED: 21 ML/MIN/1.73M2
GLUCOSE SERPL-MCNC: 94 MG/DL (ref 74–99)
HCT VFR BLD AUTO: 26.2 % (ref 40.7–50.3)
HGB BLD-MCNC: 8.4 G/DL (ref 13–17)
IMM GRANULOCYTES # BLD AUTO: <0.03 K/UL (ref 0–0.3)
IMM GRANULOCYTES NFR BLD: 0 %
LYMPHOCYTES NFR BLD: 1.4 K/UL (ref 1.1–3.7)
LYMPHOCYTES RELATIVE PERCENT: 26 % (ref 24–43)
MCH RBC QN AUTO: 32.1 PG (ref 25.2–33.5)
MCHC RBC AUTO-ENTMCNC: 32.1 G/DL (ref 28.4–34.8)
MCV RBC AUTO: 100 FL (ref 82.6–102.9)
MONOCYTES NFR BLD: 0.81 K/UL (ref 0.1–1.2)
MONOCYTES NFR BLD: 15 % (ref 3–12)
NEUTROPHILS NFR BLD: 54 % (ref 36–65)
NEUTS SEG NFR BLD: 3.03 K/UL (ref 1.5–8.1)
NRBC BLD-RTO: 0 PER 100 WBC
PLATELET # BLD AUTO: 219 K/UL (ref 138–453)
PMV BLD AUTO: 9.9 FL (ref 8.1–13.5)
POTASSIUM SERPL-SCNC: 4.2 MMOL/L (ref 3.7–5.3)
RBC # BLD AUTO: 2.62 M/UL (ref 4.21–5.77)
SODIUM SERPL-SCNC: 137 MMOL/L (ref 136–145)
WBC OTHER # BLD: 5.5 K/UL (ref 3.5–11.3)

## 2025-06-25 PROCEDURE — 6370000000 HC RX 637 (ALT 250 FOR IP): Performed by: STUDENT IN AN ORGANIZED HEALTH CARE EDUCATION/TRAINING PROGRAM

## 2025-06-25 PROCEDURE — 85025 COMPLETE CBC W/AUTO DIFF WBC: CPT

## 2025-06-25 PROCEDURE — 6370000000 HC RX 637 (ALT 250 FOR IP): Performed by: INTERNAL MEDICINE

## 2025-06-25 PROCEDURE — 99232 SBSQ HOSP IP/OBS MODERATE 35: CPT | Performed by: STUDENT IN AN ORGANIZED HEALTH CARE EDUCATION/TRAINING PROGRAM

## 2025-06-25 PROCEDURE — 2500000003 HC RX 250 WO HCPCS

## 2025-06-25 PROCEDURE — 6370000000 HC RX 637 (ALT 250 FOR IP)

## 2025-06-25 PROCEDURE — 6360000002 HC RX W HCPCS

## 2025-06-25 PROCEDURE — 99233 SBSQ HOSP IP/OBS HIGH 50: CPT | Performed by: INTERNAL MEDICINE

## 2025-06-25 PROCEDURE — 80048 BASIC METABOLIC PNL TOTAL CA: CPT

## 2025-06-25 PROCEDURE — 99232 SBSQ HOSP IP/OBS MODERATE 35: CPT | Performed by: INTERNAL MEDICINE

## 2025-06-25 PROCEDURE — 36415 COLL VENOUS BLD VENIPUNCTURE: CPT

## 2025-06-25 RX ORDER — SODIUM BICARBONATE 650 MG/1
1300 TABLET ORAL 4 TIMES DAILY
Qty: 240 TABLET | Refills: 0 | Status: SHIPPED | OUTPATIENT
Start: 2025-06-25 | End: 2025-09-23

## 2025-06-25 RX ORDER — DOXAZOSIN 2 MG/1
2 TABLET ORAL DAILY
Qty: 30 TABLET | Refills: 3 | Status: SHIPPED | OUTPATIENT
Start: 2025-06-26

## 2025-06-25 RX ORDER — FERROUS SULFATE 325(65) MG
325 TABLET, DELAYED RELEASE (ENTERIC COATED) ORAL
Qty: 90 TABLET | Refills: 3 | Status: SHIPPED | OUTPATIENT
Start: 2025-06-26

## 2025-06-25 RX ORDER — ERGOCALCIFEROL 1.25 MG/1
50000 CAPSULE ORAL WEEKLY
Qty: 8 CAPSULE | Refills: 0 | Status: SHIPPED | OUTPATIENT
Start: 2025-07-01 | End: 2025-08-20

## 2025-06-25 RX ORDER — NIFEDIPINE 30 MG/1
90 TABLET, EXTENDED RELEASE ORAL DAILY
Qty: 30 TABLET | Refills: 3 | Status: SHIPPED | OUTPATIENT
Start: 2025-06-26 | End: 2025-06-25

## 2025-06-25 RX ORDER — CLONIDINE HYDROCHLORIDE 0.1 MG/1
0.1 TABLET ORAL 4 TIMES DAILY
Qty: 60 TABLET | Refills: 3 | Status: SHIPPED | OUTPATIENT
Start: 2025-06-25 | End: 2025-06-25

## 2025-06-25 RX ORDER — CLONIDINE HYDROCHLORIDE 0.1 MG/1
0.2 TABLET ORAL 3 TIMES DAILY
Status: DISCONTINUED | OUTPATIENT
Start: 2025-06-25 | End: 2025-06-25

## 2025-06-25 RX ORDER — CHLORTHALIDONE 25 MG/1
25 TABLET ORAL DAILY
Qty: 30 TABLET | Refills: 3 | Status: SHIPPED | OUTPATIENT
Start: 2025-06-26

## 2025-06-25 RX ORDER — NIFEDIPINE 30 MG/1
90 TABLET, EXTENDED RELEASE ORAL DAILY
Qty: 90 TABLET | Refills: 0 | Status: SHIPPED | OUTPATIENT
Start: 2025-06-26 | End: 2025-07-26

## 2025-06-25 RX ORDER — CLONIDINE HYDROCHLORIDE 0.1 MG/1
0.1 TABLET ORAL 4 TIMES DAILY
Status: DISCONTINUED | OUTPATIENT
Start: 2025-06-25 | End: 2025-06-25 | Stop reason: HOSPADM

## 2025-06-25 RX ORDER — CARVEDILOL 12.5 MG/1
37.5 TABLET ORAL 2 TIMES DAILY WITH MEALS
Qty: 180 TABLET | Refills: 1 | Status: SHIPPED | OUTPATIENT
Start: 2025-06-25 | End: 2025-08-24

## 2025-06-25 RX ORDER — CLONIDINE HYDROCHLORIDE 0.1 MG/1
0.1 TABLET ORAL 4 TIMES DAILY
Qty: 120 TABLET | Refills: 0 | Status: SHIPPED | OUTPATIENT
Start: 2025-06-25 | End: 2025-07-25

## 2025-06-25 RX ORDER — CARVEDILOL 12.5 MG/1
37.5 TABLET ORAL 2 TIMES DAILY WITH MEALS
Qty: 60 TABLET | Refills: 3 | Status: SHIPPED | OUTPATIENT
Start: 2025-06-25 | End: 2025-06-25

## 2025-06-25 RX ORDER — SPIRONOLACTONE 25 MG/1
25 TABLET ORAL DAILY
Qty: 30 TABLET | Refills: 3 | Status: SHIPPED | OUTPATIENT
Start: 2025-06-26

## 2025-06-25 RX ORDER — ISOSORBIDE MONONITRATE 60 MG/1
60 TABLET, EXTENDED RELEASE ORAL DAILY
Qty: 30 TABLET | Refills: 3 | Status: SHIPPED | OUTPATIENT
Start: 2025-06-26

## 2025-06-25 RX ORDER — SODIUM BICARBONATE 650 MG/1
1300 TABLET ORAL 4 TIMES DAILY
Status: DISCONTINUED | OUTPATIENT
Start: 2025-06-25 | End: 2025-06-25 | Stop reason: HOSPADM

## 2025-06-25 RX ADMIN — HEPARIN SODIUM 5000 UNITS: 5000 INJECTION INTRAVENOUS; SUBCUTANEOUS at 13:15

## 2025-06-25 RX ADMIN — SODIUM BICARBONATE 650 MG: 650 TABLET ORAL at 07:54

## 2025-06-25 RX ADMIN — ASPIRIN 81 MG: 81 TABLET, COATED ORAL at 07:53

## 2025-06-25 RX ADMIN — CLONIDINE HYDROCHLORIDE 0.1 MG: 0.1 TABLET ORAL at 13:15

## 2025-06-25 RX ADMIN — DOXAZOSIN 2 MG: 2 TABLET ORAL at 09:01

## 2025-06-25 RX ADMIN — CARVEDILOL 37.5 MG: 25 TABLET, FILM COATED ORAL at 15:48

## 2025-06-25 RX ADMIN — NIFEDIPINE 90 MG: 30 TABLET, FILM COATED, EXTENDED RELEASE ORAL at 09:01

## 2025-06-25 RX ADMIN — CLONIDINE HYDROCHLORIDE 0.1 MG: 0.1 TABLET ORAL at 07:54

## 2025-06-25 RX ADMIN — PHENYTOIN SODIUM 100 MG: 100 CAPSULE, EXTENDED RELEASE ORAL at 13:14

## 2025-06-25 RX ADMIN — CLONIDINE HYDROCHLORIDE 0.1 MG: 0.1 TABLET ORAL at 15:48

## 2025-06-25 RX ADMIN — HYDRALAZINE HYDROCHLORIDE 100 MG: 50 TABLET ORAL at 05:31

## 2025-06-25 RX ADMIN — SODIUM CHLORIDE, PRESERVATIVE FREE 10 ML: 5 INJECTION INTRAVENOUS at 07:56

## 2025-06-25 RX ADMIN — LAMOTRIGINE 25 MG: 25 TABLET ORAL at 07:53

## 2025-06-25 RX ADMIN — CARVEDILOL 37.5 MG: 25 TABLET, FILM COATED ORAL at 07:53

## 2025-06-25 RX ADMIN — CHLORTHALIDONE 25 MG: 25 TABLET ORAL at 07:54

## 2025-06-25 RX ADMIN — HEPARIN SODIUM 5000 UNITS: 5000 INJECTION INTRAVENOUS; SUBCUTANEOUS at 05:31

## 2025-06-25 RX ADMIN — SPIRONOLACTONE 25 MG: 25 TABLET, FILM COATED ORAL at 07:54

## 2025-06-25 RX ADMIN — HYDRALAZINE HYDROCHLORIDE 100 MG: 50 TABLET ORAL at 13:15

## 2025-06-25 RX ADMIN — FERROUS SULFATE TAB EC 325 MG (65 MG FE EQUIVALENT) 325 MG: 325 (65 FE) TABLET DELAYED RESPONSE at 07:54

## 2025-06-25 RX ADMIN — PHENYTOIN SODIUM 100 MG: 100 CAPSULE, EXTENDED RELEASE ORAL at 07:53

## 2025-06-25 RX ADMIN — SODIUM BICARBONATE 1300 MG: 650 TABLET ORAL at 15:48

## 2025-06-25 RX ADMIN — ISOSORBIDE MONONITRATE 60 MG: 30 TABLET, EXTENDED RELEASE ORAL at 07:54

## 2025-06-25 RX ADMIN — SODIUM BICARBONATE 650 MG: 650 TABLET ORAL at 13:14

## 2025-06-25 ASSESSMENT — PAIN SCALES - GENERAL
PAINLEVEL_OUTOF10: 0

## 2025-06-25 NOTE — PLAN OF CARE
Problem: Chronic Conditions and Co-morbidities  Goal: Patient's chronic conditions and co-morbidity symptoms are monitored and maintained or improved  Outcome: Completed     Problem: Discharge Planning  Goal: Discharge to home or other facility with appropriate resources  Outcome: Completed     Problem: Neurosensory - Adult  Goal: Achieves stable or improved neurological status  Outcome: Completed  Goal: Achieves maximal functionality and self care  Outcome: Completed     Problem: Cardiovascular - Adult  Goal: Maintains optimal cardiac output and hemodynamic stability  Outcome: Completed  Goal: Absence of cardiac dysrhythmias or at baseline  Outcome: Completed     Problem: Pain  Goal: Verbalizes/displays adequate comfort level or baseline comfort level  Outcome: Completed     Problem: Safety - Adult  Goal: Free from fall injury  Outcome: Completed     Problem: Nutrition Deficit:  Goal: Optimize nutritional status  Outcome: Completed

## 2025-06-25 NOTE — PLAN OF CARE
Problem: Chronic Conditions and Co-morbidities  Goal: Patient's chronic conditions and co-morbidity symptoms are monitored and maintained or improved  6/24/2025 2148 by Bhavani Smith RN  Outcome: Progressing  6/24/2025 1818 by Isi Lopez RN  Outcome: Progressing     Problem: Discharge Planning  Goal: Discharge to home or other facility with appropriate resources  6/24/2025 2148 by Bhavani Smith RN  Outcome: Progressing  6/24/2025 1818 by Isi Lopez RN  Outcome: Progressing     Problem: Neurosensory - Adult  Goal: Achieves stable or improved neurological status  6/24/2025 2148 by Bhavani Smith RN  Outcome: Progressing  6/24/2025 1818 by Isi Lopez RN  Outcome: Progressing  Goal: Achieves maximal functionality and self care  6/24/2025 2148 by Bhavani Smith RN  Outcome: Progressing  6/24/2025 1818 by Isi Lopez RN  Outcome: Progressing     Problem: Cardiovascular - Adult  Goal: Maintains optimal cardiac output and hemodynamic stability  6/24/2025 2148 by Bhavani Smith RN  Outcome: Progressing  6/24/2025 1818 by Isi Lopez RN  Outcome: Progressing  Goal: Absence of cardiac dysrhythmias or at baseline  6/24/2025 2148 by Bhavani Smith RN  Outcome: Progressing  6/24/2025 1818 by Isi Lopez RN  Outcome: Progressing     Problem: Pain  Goal: Verbalizes/displays adequate comfort level or baseline comfort level  6/24/2025 2148 by Bhavani Smith RN  Outcome: Progressing  6/24/2025 1818 by Isi Lopez RN  Outcome: Progressing     Problem: Safety - Adult  Goal: Free from fall injury  6/24/2025 2148 by Bhavani Smith RN  Outcome: Progressing  6/24/2025 1818 by Isi Lopez RN  Outcome: Progressing     Problem: Nutrition Deficit:  Goal: Optimize nutritional status  6/24/2025 2148 by Bhavani Smith RN  Outcome: Progressing  6/24/2025 1818 by Isi Lopez RN  Outcome: Progressing

## 2025-06-25 NOTE — DISCHARGE INSTRUCTIONS
Please call [facility] at  and make an appt to see a kidney doctor  You are treated in the hospital for elevated blood pressure and kidney disease.  Please follow-up with your PCP and the kidney doctor as advised.  Please continue taking medications as advised.  Please do the lab test called BMP and follow-up on the results with your kidney doctor and PCP  If you develop any severe headache, blurring of vision, weakness or numbness, chest pain, any other worsening symptoms please call 911 or visit the nearest emergency department.

## 2025-06-25 NOTE — PROGRESS NOTES
Cardiology Progress Note                       Date:   6/22/2025  Patient name: Maldonado Geiger  Date of admission:  6/20/2025  2:31 PM  MRN:   [id number]  YOB: 1960  PCP: Fabi Anders MD    Reason for Admission: Hypertensive urgency    Subjective:       There were no acute events overnight, remained hemodynamically stable, denies chest pain, dyspnea, orthopnea or palpitations.  Blood pressure is now better-controlled ranging 140s to 150s systolic  On Cardene infusion at 7.5 mg/hour     Scheduled Meds:   amLODIPine  10 mg Oral Daily    carvedilol  25 mg Oral BID WC    aspirin  81 mg Oral Daily    hydrALAZINE  100 mg Oral 3 times per day    isosorbide mononitrate  60 mg Oral Daily    nicotine  1 patch TransDERmal Daily    sodium chloride flush  5-40 mL IntraVENous 2 times per day    heparin (porcine)  5,000 Units SubCUTAneous 3 times per day    atorvastatin  40 mg Oral Nightly    tamsulosin  0.4 mg Oral Daily       Continuous Infusions:   sodium chloride      niCARdipine 9.5 mg/hr (06/22/25 0337)       Labs:     CBC:   Recent Labs     06/20/25  1513 06/21/25  0734   WBC 5.7 6.1   HGB 10.3* 9.3*    229     BMP:    Recent Labs     06/20/25  1513 06/21/25  0734    140   K 5.0 4.4    110*   CO2 22 18*   BUN 36* 32*   CREATININE 3.0* 2.9*   GLUCOSE 110* 131*     Hepatic:   Recent Labs     06/20/25  1513   AST 23   ALT 11   BILITOT 0.4   ALKPHOS 80     Troponin: No results for input(s): \"TROPONINI\" in the last 72 hours.  BNP: No results for input(s): \"BNP\" in the last 72 hours.  Lipids:   Recent Labs     06/21/25  0734   CHOL 163   HDL 55     INR: No results for input(s): \"INR\" in the last 72 hours.      Objective:     Vitals: BP (!) 151/71   Pulse 88   Temp 98.2 °F (36.8 °C) (Oral)   Resp 16   Ht 1.93 m (6' 4\")   Wt 71.2 kg (157 lb)   SpO2 99%   BMI 19.11 kg/m²     General appearance: awake, alert, in no apparent respiratory distress on room air  HEENT: Head: 
               Cardiology Progress Note                       Date:   6/25/2025  Patient name: Maldonado Geiger  Date of admission:  6/20/2025  2:31 PM  MRN:   [id number]  YOB: 1960  PCP: Fabi Anders MD    Reason for Admission: Hypertensive urgency    Subjective:       Blood pressure is better controlled ranging 140's systolic   Denies any chest pain, dyspnea, orthopnea or leg edema  Cr 3.2 this am     Scheduled Meds:   cloNIDine  0.1 mg Oral 4x Daily    NIFEdipine  90 mg Oral Daily    vitamin D  50,000 Units Oral Weekly    chlorthalidone  25 mg Oral Daily    doxazosin  2 mg Oral Daily    ferrous sulfate  325 mg Oral Daily with breakfast    carvedilol  37.5 mg Oral BID WC    spironolactone  25 mg Oral Daily    sodium bicarbonate  650 mg Oral 4x Daily    lamoTRIgine  25 mg Oral Daily    phenytoin  100 mg Oral TID    aspirin  81 mg Oral Daily    hydrALAZINE  100 mg Oral 3 times per day    isosorbide mononitrate  60 mg Oral Daily    nicotine  1 patch TransDERmal Daily    sodium chloride flush  5-40 mL IntraVENous 2 times per day    heparin (porcine)  5,000 Units SubCUTAneous 3 times per day    atorvastatin  40 mg Oral Nightly       Continuous Infusions:   sodium chloride         Labs:     CBC:   Recent Labs     06/23/25  0645 06/24/25  0613 06/25/25  0803   WBC 6.5 6.6 5.5   HGB 8.7* 8.5* 8.4*    203 219     BMP:    Recent Labs     06/23/25  0645 06/24/25  0613 06/25/25  0803    139 137   K 4.2 4.5 4.2   * 111* 108*   CO2 15* 17* 17*   BUN 29* 30* 27*   CREATININE 2.9* 3.4* 3.2*   GLUCOSE 95 88 94     Hepatic:   No results for input(s): \"AST\", \"ALT\", \"BILITOT\", \"ALKPHOS\" in the last 72 hours.    Invalid input(s): \"ALB\"    Troponin: No results for input(s): \"TROPONINI\" in the last 72 hours.  BNP: No results for input(s): \"BNP\" in the last 72 hours.  Lipids:   No results for input(s): \"CHOL\", \"HDL\" in the last 72 hours.    Invalid input(s): \"LDLCALCU\"    INR: No results for input(s): 
    [facility]  Internal Medicine Teaching Residency Program  Inpatient Daily Progress Note  ______________________________________________________________________________    Patient: Maldonado Geiger  YOB: 1960   MRN:[id number]    Acct: [id number]     Room: 2016/2016-01  Admit date: 6/20/2025  Today's date: 06/21/25  Number of days in the hospital: 1    SUBJECTIVE   Admitting Diagnosis: Hypertensive emergency  CC:   Chief Complaint   Patient presents with    Hypertension        Pt examined at bedside. Chart & results reviewed.   Blood pressure is fairly controlled this morning.  UDS, UA are unremarkable  Cardiology consulted for elevated troponin  Elevated pro BNP, Echo is pending    Intake/Output Summary (Last 24 hours) at 6/21/2025 0831  Last data filed at 6/21/2025 0706  Gross per 24 hour   Intake 1391.97 ml   Output 1200 ml   Net 191.97 ml      Review of Systems      BRIEF HISTORY   The patient is a 64 y.o. male with PMHx of Traumatic brain injury s/p craniotomy in 1998, headaches, seizure disorder, TIA, HTN, CKD, H/o Renal cancer s/p right nephrectomy,     They present to the ED with a chief complaint of high blood pressure and associated symptoms including headaches, blurring of vision, abdominal pain. Patient did not have any chest pain or shortness of breath. Patient states he saw his PCP and he was told to visit ER 2 days back, He states he has not taken any medications for past 4 months.   Initial Vitals on Presentation:  Temp: 98.2, RR: 16, HR 86, /113, SPO2 98% on room air     Initial Course in the ED:   - Pt presented with above complaint, found to be in hypertensive emergency .   - Lab work showing JARED on CKD, elevated troponin, elevated probnp   - Imaging showing interstitial pulmonary edema.  - BP home medications were restarted and patient was also started on cardene drip.      OBJECTIVE     Vital Signs:  BP (!) 156/63   Pulse (!) 101   
    [facility]  Internal Medicine Teaching Residency Program  Inpatient Daily Progress Note  ______________________________________________________________________________    Patient: Maldonado Geiger  YOB: 1960   MRN:[id number]    Acct: [id number]     Room:   Admit date: 2025  Today's date: 25  Number of days in the hospital: 2    SUBJECTIVE   Admitting Diagnosis: Hypertensive emergency  CC:   Chief Complaint   Patient presents with    Hypertension      Patient saturating well on room air.  Denies any chest pain, nausea, vomiting, shortness of breath, headache, blurring of vision.  Blood pressure still elevated on Cardene infusion.        BRIEF HISTORY   The patient is a 64 y.o. male with PMHx of Traumatic brain injury s/p craniotomy in , headaches, seizure disorder, TIA, HTN, CKD, H/o Renal cancer s/p right nephrectomy,     They present to the ED with a chief complaint of high blood pressure and associated symptoms including headaches, blurring of vision, abdominal pain. Patient did not have any chest pain or shortness of breath. Patient states he saw his PCP and he was told to visit ER 2 days back, He states he has not taken any medications for past 4 months.   Initial Vitals on Presentation:  Temp: 98.2, RR: 16, HR 86, /113, SPO2 98% on room air     Initial Course in the ED:   - Pt presented with above complaint, found to be in hypertensive emergency .   - Lab work showing JARED on CKD, elevated troponin, elevated probnp   - Imaging showing interstitial pulmonary edema.  - BP home medications were restarted and patient was also started on cardene drip.      OBJECTIVE     Vital Signs:  BP (!) 151/71   Pulse 70   Temp 98.1 °F (36.7 °C)   Resp 16   Ht 1.93 m (6' 4\")   Wt 71.2 kg (157 lb)   SpO2 99%   BMI 19.11 kg/m²       Temp (24hrs), Av.1 °F (36.7 °C), Min:97.9 °F (36.6 °C), Max:98.2 °F (36.8 °C)      In: 2663.3   Out: 
    [facility]  Internal Medicine Teaching Residency Program  Inpatient Daily Progress Note  ______________________________________________________________________________    Patient: Maldonado Geiger  YOB: 1960   MRN:[id number]    Acct: [id number]     Room: 2016/2016-01  Admit date: 6/20/2025  Today's date: 06/24/25  Number of days in the hospital: 4    SUBJECTIVE   Admitting Diagnosis: Accelerated hypertension  CC:   Chief Complaint   Patient presents with    Hypertension      Patient saturating well on room air.   He is still requiring cardene gtt.  Patient is on coreg 37.5, chlorthalidone 50, hydralazine 100 TID, IMDUR 60, aldactone 25 and procardia xl 90  Denies any chest pain, nausea, vomiting, shortness of breath, headache, blurring of vision.  Blood pressure still elevated on Cardene infusion.        BRIEF HISTORY   The patient is a 64 y.o. male with PMHx of Traumatic brain injury s/p craniotomy in 1998, headaches, seizure disorder, TIA, HTN, CKD, H/o Renal cancer s/p right nephrectomy,     They present to the ED with a chief complaint of high blood pressure and associated symptoms including headaches, blurring of vision, abdominal pain. Patient did not have any chest pain or shortness of breath. Patient states he saw his PCP and he was told to visit ER 2 days back, He states he has not taken any medications for past 4 months.   Initial Vitals on Presentation:  Temp: 98.2, RR: 16, HR 86, /113, SPO2 98% on room air     Initial Course in the ED:   - Pt presented with above complaint, found to be in hypertensive emergency .   - Lab work showing JARED on CKD, elevated troponin, elevated probnp   - Imaging showing interstitial pulmonary edema.  - BP home medications were restarted and patient was also started on cardene drip.      OBJECTIVE     Vital Signs:  BP (!) 165/78   Pulse 91   Temp 98.8 °F (37.1 °C) (Oral)   Resp 18   Ht 1.93 m (6' 3.98\")   Wt 
    [facility]  Internal Medicine Teaching Residency Program  Inpatient Daily Progress Note  ______________________________________________________________________________    Patient: Maldonado Geiger  YOB: 1960   MRN:[id number]    Acct: [id number]     Room: 2016/2016-01  Admit date: 6/20/2025  Today's date: 06/25/25  Number of days in the hospital: 5    SUBJECTIVE   Admitting Diagnosis: Hypertensive emergency  CC:   Chief Complaint   Patient presents with    Hypertension      Patient saturating well on room air.   Patient is off cardene drip since yesterday. SBP is in 150s  Patient is on coreg 37.5, chlorthalidone 50>25, hydralazine 100 TID, IMDUR 60, aldactone 25 and procardia xl 90, Cardura 2mg  Echo showed EF of 39% and abnormal diastolic function.  Denies any chest pain, nausea, vomiting, shortness of breath, headache, blurring of vision.  Blood pressure still elevated on Cardene infusion.        BRIEF HISTORY   The patient is a 64 y.o. male with PMHx of Traumatic brain injury s/p craniotomy in 1998, headaches, seizure disorder, TIA, HTN, CKD, H/o Renal cancer s/p right nephrectomy,     They present to the ED with a chief complaint of high blood pressure and associated symptoms including headaches, blurring of vision, abdominal pain. Patient did not have any chest pain or shortness of breath. Patient states he saw his PCP and he was told to visit ER 2 days back, He states he has not taken any medications for past 4 months.   Initial Vitals on Presentation:  Temp: 98.2, RR: 16, HR 86, /113, SPO2 98% on room air     Initial Course in the ED:   - Pt presented with above complaint, found to be in hypertensive emergency .   - Lab work showing JARED on CKD, elevated troponin, elevated probnp   - Imaging showing interstitial pulmonary edema.  - BP home medications were restarted and patient was also started on cardene drip.      OBJECTIVE     Vital Signs:  BP 
CLINICAL PHARMACY NOTE: MEDS TO BEDS    Total # of Prescriptions Filled: 10   The following medications were delivered to the patient:  Nifedipine  Sodium bicarbonate  Chlorthalidone  Carvedilol  Clonidine  Ferosul  Vitamin d2  Isosorbide  Spironolactone  doxazosin    Additional Documentation:    
Comprehensive Nutrition Assessment    Type and Reason for Visit:  Initial, Positive nutrition screen    Nutrition Recommendations/Plan:   Continue current diet.  Will monitor labs, weights, intake, GI status, and plan of care.     Malnutrition Assessment:  Malnutrition Status:  Insufficient data (06/23/25 1749)    Context:  Acute Illness     Findings of the 6 clinical characteristics of malnutrition:  Energy Intake:  Unable to assess  Weight Loss:  Unable to assess     Body Fat Loss:  Unable to assess     Muscle Mass Loss:  Unable to assess    Fluid Accumulation:  Unable to assess     Strength:  Not Performed    Nutrition Assessment:    Pt admit with accelerated HTN. Seen for +nutrition screen wt loss. PMH: HTN, CKD3. Pt out of room at attempted visit. Intakes recorded so far of %. Limited recent wt data per EMR, most recent wt on chart of 162# 5/2023.    Nutrition Related Findings:    No edema. Labs/meds reviewed. Wound Type: None       Current Nutrition Intake & Therapies:    Average Meal Intake: %  Average Supplements Intake: None Ordered  ADULT DIET; Regular; Low Sodium (2 gm); No Pork  Diet NPO    Anthropometric Measures:  Height: 193 cm (6' 3.98\")  Ideal Body Weight (IBW): 202 lbs (92 kg)       Current Body Weight: 71.2 kg (156 lb 15.5 oz), 77.7 % IBW. Weight Source: Not specified  Current BMI (kg/m2): 19.1  Usual Body Weight: 73.5 kg (162 lb) (5/2023.)     % Weight Change (Calculated): -3.1    BMI Categories: Normal Weight (BMI 18.5-24.9)    Estimated Daily Nutrient Needs:  Energy Requirements Based On: Kcal/kg  Weight Used for Energy Requirements: Current  Energy (kcal/day): 1271-0761 kcal/d per 25-30 kcal/kg  Weight Used for Protein Requirements: Current  Protein (g/day): 71-85 g/d per 1-1.2 g/kg  Method Used for Fluid Requirements: 1 ml/kcal  Fluid (ml/day): or per physician    Nutrition Diagnosis:   Altered nutrition-related lab values related to renal dysfunction as evidenced by lab 
Comprehensive Nutrition Assessment    Type and Reason for Visit:  Reassess    Nutrition Recommendations/Plan:   Continue current diet.  Start Renal ONS (Nepro) BID.  Will monitor labs, weights, intake, GI status, and plan of care.     Malnutrition Assessment:  Malnutrition Status:  Insufficient data (06/23/25 4642)    Context:  Acute Illness     Findings of the 6 clinical characteristics of malnutrition:  Energy Intake:  Unable to assess  Weight Loss:  Unable to assess     Body Fat Loss:  Unable to assess     Muscle Mass Loss:  Unable to assess    Fluid Accumulation:  Unable to assess     Strength:  Not Performed    Nutrition Assessment:    F/U. Pt denies appetite loss PTA. Unsure if he has had any wt loss, agreeable to ONS if not discharged today.    Nutrition Related Findings:    No edema. LBM 6/24. Labs/meds reviewed. Wound Type: None       Current Nutrition Intake & Therapies:    Average Meal Intake: %  Average Supplements Intake: None Ordered  ADULT DIET; Regular; Low Potassium (Less than 3000 mg/day); 1800 ml  ADULT ORAL NUTRITION SUPPLEMENT; Lunch, Dinner; Renal Oral Supplement    Anthropometric Measures:  Height: 193 cm (6' 3.98\")  Ideal Body Weight (IBW): 202 lbs (92 kg)       Current Body Weight: 71.2 kg (156 lb 15.5 oz), 77.7 % IBW. Weight Source: Not specified  Current BMI (kg/m2): 19.1  Usual Body Weight: 73.5 kg (162 lb) (5/2023.)     % Weight Change (Calculated): -3.1    BMI Categories: Normal Weight (BMI 18.5-24.9)    Estimated Daily Nutrient Needs:  Energy Requirements Based On: Kcal/kg  Weight Used for Energy Requirements: Current  Energy (kcal/day): 7264-3722 kcal/d per 25-30 kcal/kg  Weight Used for Protein Requirements: Current  Protein (g/day): 71-85 g/d per 1-1.2 g/kg  Method Used for Fluid Requirements: 1 ml/kcal  Fluid (ml/day): or per physician    Nutrition Diagnosis:   Altered nutrition-related lab values related to renal dysfunction as evidenced by lab values    Nutrition 
Nephrology Progress Note      SUBJECTIVE       Pt was seen and examined. No acute issues overnite. Stable hemodynamics .   Patient is still on Cardene drip.  Patient is without any nausea, vomiting or diarrhea.  He denies any chest pain.  He continues to be on multiple BP meds.  Secondary hypertension workup was sent out, most of it is still pending.  Patient currently on Coreg, hydralazine, amlodipine and chlorthalidone.    OBJECTIVE      CURRENT TEMPERATURE:  Temp: 98.4 °F (36.9 °C)  MAXIMUM TEMPERATURE OVER 24HRS:  Temp (24hrs), Av.3 °F (36.8 °C), Min:98 °F (36.7 °C), Max:98.5 °F (36.9 °C)    CURRENT RESPIRATORY RATE:  Respirations: 16  CURRENT PULSE:  Pulse: 96  CURRENT BLOOD PRESSURE:  BP: (!) 152/84  24HR BLOOD PRESSURE RANGE:  Systolic (24hrs), Av , Min:101 , Max:189   ; Diastolic (24hrs), Av, Min:60, Max:168    24HR INTAKE/OUTPUT:    Intake/Output Summary (Last 24 hours) at 2025 0917  Last data filed at 2025 0616  Gross per 24 hour   Intake 2070.73 ml   Output 2075 ml   Net -4.27 ml     WEIGHT :Patient Vitals for the past 96 hrs (Last 3 readings):   Weight   25 1945 71.2 kg (157 lb)     PHYSICAL EXAM      GENERAL APPEARANCE:Awake, alert, in no acute distress  SKIN: warm and dry, no rash or erythema  EYES: conjunctivae normal and sclera anicteric  ENT: no thrush no pharyngeal congestion   NECK:   No JVD. No carotid bruits and no carotid lymphadenopathy .  PULMONARY: lungs are clear on auscultation. No Wheezing, no ronchi .   CADRDIOVASCULAR: S1 and S2 normal NO S3 and NO S4 . No rubs , no murmur.   ABDOMEN: soft nontender, bowel sounds present, no organomegaly, no ascites.       EXTREMITIES: no cyanosis, clubbing or edema     CURRENT MEDICATIONS      chlorthalidone (HYGROTON) tablet 25 mg, Daily  lamoTRIgine (LAMICTAL) tablet 25 mg, Daily  phenytoin (DILANTIN) ER capsule 100 mg, TID  amLODIPine (NORVASC) tablet 10 mg, Daily  carvedilol (COREG) tablet 25 mg, BID WC  aspirin EC 
Nephrology Progress Note      SUBJECTIVE    Patient was seen and examined.  He is off Cardene drip.  His blood pressure is now relatively better controlled and most recent blood pressure is 143/80.  Patient states that he is not following any kidney doctor as an outpatient.  He denies any nausea and vomiting.  Patient is also not aware of that he has advanced kidney disease and may need dialysis in the future.    OBJECTIVE      CURRENT TEMPERATURE:  Temp: 98.3 °F (36.8 °C)  MAXIMUM TEMPERATURE OVER 24HRS:  Temp (24hrs), Av.7 °F (37.1 °C), Min:98.3 °F (36.8 °C), Max:99.2 °F (37.3 °C)    CURRENT RESPIRATORY RATE:  Respirations: 20  CURRENT PULSE:  Pulse: 83  CURRENT BLOOD PRESSURE:  BP: (!) 143/79  24HR BLOOD PRESSURE RANGE:  Systolic (24hrs), Av , Min:120 , Max:169   ; Diastolic (24hrs), Av, Min:59, Max:89    24HR INTAKE/OUTPUT:    Intake/Output Summary (Last 24 hours) at 2025 1724  Last data filed at 2025 1430  Gross per 24 hour   Intake --   Output 2640 ml   Net -2640 ml     WEIGHT :Patient Vitals for the past 96 hrs (Last 3 readings):   Weight   25 1945 71.2 kg (157 lb)     PHYSICAL EXAM      GENERAL APPEARANCE: Awake and alert x 3  SKIN: Warm to touch and no erythema   EYES: conjunctivae normal and sclera anicteric  ENT: no thrush no pharyngeal congestion   NECK: No carotid bruit or.  PULMONARY: Bilateral air entry and clear.   CADRDIOVASCULAR: S1 and S2 audible no S3  ABDOMEN: soft nontender, bowel sounds present, no organomegaly, no ascites.       EXTREMITIES: No edema    CURRENT MEDICATIONS      NIFEdipine (PROCARDIA XL) extended release tablet 90 mg, Daily  vitamin D (ERGOCALCIFEROL) capsule 50,000 Units, Weekly  [START ON 2025] chlorthalidone (HYGROTON) tablet 25 mg, Daily  doxazosin (CARDURA) tablet 2 mg, Daily  [START ON 2025] ferrous sulfate (FE TABS 325) EC tablet 325 mg, Daily with breakfast  carvedilol (COREG) tablet 37.5 mg, BID WC  spironolactone (ALDACTONE) 
Nephrology Progress Note      SUBJECTIVE    Seen and examined at bedside  No complaints today  I educated the patient on monitoring and limiting his salt intake at home, he eats out a lot  I told him we will provide him our office number in his discharge paperwork he needs to call make an appointment with our office  OBJECTIVE      CURRENT TEMPERATURE:  Temp: 98.4 °F (36.9 °C)  MAXIMUM TEMPERATURE OVER 24HRS:  Temp (24hrs), Av.3 °F (36.8 °C), Min:98.1 °F (36.7 °C), Max:98.5 °F (36.9 °C)    CURRENT RESPIRATORY RATE:  Respirations: 19  CURRENT PULSE:  Pulse: 78  CURRENT BLOOD PRESSURE:  BP: (!) 159/92  24HR BLOOD PRESSURE RANGE:  Systolic (24hrs), Av , Min:115 , Max:162   ; Diastolic (24hrs), Av, Min:58, Max:98    24HR INTAKE/OUTPUT:    Intake/Output Summary (Last 24 hours) at 2025 1335  Last data filed at 2025 1313  Gross per 24 hour   Intake 330 ml   Output 3355 ml   Net -3025 ml     WEIGHT :No data found.    PHYSICAL EXAM      GENERAL APPEARANCE: Awake and alert x 3  SKIN: Warm to touch and no erythema   EYES: conjunctivae normal and sclera anicteric  ENT: no thrush no pharyngeal congestion   PULMONARY: Bilateral air entry and clear.   CARDIOVASCULAR: S1 and S2 audible no S3  ABDOMEN: soft nontender, bowel sounds present, no organomegaly, no ascites.       EXTREMITIES: No edema    CURRENT MEDICATIONS      cloNIDine (CATAPRES) tablet 0.1 mg, 4x Daily  NIFEdipine (PROCARDIA XL) extended release tablet 90 mg, Daily  vitamin D (ERGOCALCIFEROL) capsule 50,000 Units, Weekly  chlorthalidone (HYGROTON) tablet 25 mg, Daily  doxazosin (CARDURA) tablet 2 mg, Daily  ferrous sulfate (FE TABS 325) EC tablet 325 mg, Daily with breakfast  carvedilol (COREG) tablet 37.5 mg, BID WC  spironolactone (ALDACTONE) tablet 25 mg, Daily  sodium bicarbonate tablet 650 mg, 4x Daily  lamoTRIgine (LAMICTAL) tablet 25 mg, Daily  phenytoin (DILANTIN) ER capsule 100 mg, TID  aspirin EC tablet 81 mg, Daily  hydrALAZINE 
5,000 Units SubCUTAneous 3 times per day    atorvastatin  40 mg Oral Nightly    tamsulosin  0.4 mg Oral Daily       IV Infusions (if any):   sodium chloride      niCARdipine 3 mg/hr (06/24/25 1122)       Diagnostics:   Telemetry: Sinus rhythm    EKG 6/20/2025  Sinus rhythm 81 bpm, left axis deviation, LVH with repolarization abnormalities     Echocardiogram 6/24/2025    Left Ventricle: Mildly reduced left ventricular systolic function. EF by visual approximation is 39%. EF by 2D Simpsons Biplane is 39%. Left ventricle size is normal. Increased wall thickness. Moderate posterior thickening. Mild septal thickening. See diagram for wall motion findings. Abnormal diastolic function.    Right Ventricle: Right ventricle size is normal. Normal systolic function. TAPSE is normal. TAPSE is 3.2 cm.    Mitral Valve: Mild regurgitation.    Pericardium: Trivial pericardial effusion present.    Image quality is adequate.      Echocardiogram 10/23/2018  Low normal LV systolic function with EF 50-55%. Moderate concentric hypertrophy.   Normal size right ventricle. Normal right ventricular systolic function.   An agitated saline bubble study was performed and was negative for an interatrial shunt.     Labs:   CBC:  Recent Labs     06/23/25  0645 06/24/25  0613   WBC 6.5 6.6   HGB 8.7* 8.5*   HCT 26.8* 26.3*    203     Magnesium:No results for input(s): \"MG\" in the last 72 hours.  BMP:  Recent Labs     06/23/25  0645 06/24/25  0613    139   K 4.2 4.5   CALCIUM 8.7 8.8   CO2 15* 17*   BUN 29* 30*   CREATININE 2.9* 3.4*   LABGLOM 24* 19*   GLUCOSE 95 88     FASTING LIPID PANEL:      Component Value Date/Time    CHOL 163 06/21/2025 0734    HDL 55 06/21/2025 0734    LDL 93 06/21/2025 0734    TRIG 73 06/21/2025 0734     TSH:    Recent Labs     06/20/25  1758   TSH 2.12      HGA1C:  No results for input(s): \"LABA1C\" in the last 720 hours.     Impression:   Patient Active Problem List:     Closed fracture of orbital wall 
Max:98.5 °F (36.9 °C)      In: 2070.7   Out: 2475 [Urine:2475]    Physical Exam:  Physical Exam  Constitutional:       General: He is not in acute distress.     Appearance: He is not toxic-appearing.   HENT:      Head: Normocephalic.      Nose: Nose normal.      Mouth/Throat:      Mouth: Mucous membranes are moist.   Eyes:      General: No scleral icterus.  Cardiovascular:      Rate and Rhythm: Normal rate and regular rhythm.   Pulmonary:      Effort: Pulmonary effort is normal.      Breath sounds: Normal breath sounds.   Abdominal:      General: Bowel sounds are normal. There is no distension.      Palpations: Abdomen is soft.      Tenderness: There is no abdominal tenderness. There is no guarding or rebound.   Musculoskeletal:      Right lower leg: Edema present.      Left lower leg: Edema present.   Skin:     General: Skin is warm.      Findings: No erythema.   Neurological:      General: No focal deficit present.      Mental Status: He is alert and oriented to person, place, and time.   Psychiatric:         Mood and Affect: Mood normal.           Medications:  Scheduled Medications:    chlorthalidone  25 mg Oral Daily    lamoTRIgine  25 mg Oral Daily    phenytoin  100 mg Oral TID    amLODIPine  10 mg Oral Daily    carvedilol  25 mg Oral BID WC    aspirin  81 mg Oral Daily    hydrALAZINE  100 mg Oral 3 times per day    isosorbide mononitrate  60 mg Oral Daily    nicotine  1 patch TransDERmal Daily    sodium chloride flush  5-40 mL IntraVENous 2 times per day    heparin (porcine)  5,000 Units SubCUTAneous 3 times per day    atorvastatin  40 mg Oral Nightly    tamsulosin  0.4 mg Oral Daily     Continuous Infusions:    sodium chloride      niCARdipine 5 mg/hr (06/23/25 0613)     PRN Medicationssodium chloride flush, 5-40 mL, PRN  sodium chloride, , PRN  ondansetron, 4 mg, Q8H PRN   Or  ondansetron, 4 mg, Q6H PRN  polyethylene glycol, 17 g, Daily PRN  acetaminophen, 650 mg, Q6H PRN   Or  acetaminophen, 650 mg, Q6H

## 2025-06-25 NOTE — DISCHARGE SUMMARY
Writer educated patient on discharge paperwork, all questions asked and answered, IV and telemetry removed, patient refused wheelchair and chose to walk off unit with all patient belongings.

## 2025-06-26 LAB
COLLECT DURATION TIME SPEC: ABNORMAL H
CREAT 24H UR-MCNC: 51 MG/DL
CREATININE URINE /24 HR: ABNORMAL MG/D (ref 800–2100)
METANEPHRINE UF INTERPRETATION: ABNORMAL
METANEPHRINE UG/G CRE: 286 UG/G CRT (ref 0–300)
METANEPHRINES 24 HOUR URINE: ABNORMAL UG/D (ref 55–320)
METANEPHRINES, URINE (UMOL/L): 146 UG/L
NORMETANEPHRINE, (G/CRT): 1208 UG/G CRT (ref 0–400)
NORMETANEPHRINE, (NMOL/DAY): ABNORMAL UG/D (ref 114–865)
NORMETANEPHRINES, NMOL/L: 616 UG/L
RENIN PLAS-CCNC: 0.5 NG/ML/HR
SPECIMEN VOL ?TM UR: ABNORMAL ML

## 2025-06-28 NOTE — DISCHARGE SUMMARY
[facility]     Department of Internal Medicine - Staff Internal Medicine Teaching Service    INPATIENT DISCHARGE SUMMARY      Patient Identification:  Maldonado Geiger is a 64 y.o. male.  :  1960  MRN: [id number]     Acct: [id number]   PCP: Fabi Anders MD  Admit Date:  2025  Discharge date and time: 2025  5:00 PM   Attending Provider: No att. providers found                                     ACTIVE DISCHARGE DIAGNOSES     Hospital Problem Lists:  Principal Problem:    Hypertensive emergency  Active Problems:    NSTEMI (non-ST elevated myocardial infarction) (HCC)    Hydronephrosis of left kidney    JARED (acute kidney injury)    History of right nephrectomy    Essential hypertension    Seizure disorder (HCC)    Acute coronary syndrome (HCC)    Stage 3b chronic kidney disease (HCC)    Stage 4 chronic kidney disease (HCC)  Resolved Problems:    * No resolved hospital problems. *      HOSPITAL STAY     Brief Inpatient course:   Maldonado Geiger is a 64 y.o. male who was admitted for the management of Hypertensive emergency, presented to the emergency department with chief complaint of hypertension with associated symptoms including headaches, blurring of vision, abdominal pain.  Patient was found to be hypertensive with SBP in 190s and 200.  Patient was started on Cardene drip and his home antihypertensive medications were restarted as well.  His blood pressure remained uncontrolled after the saline home medication and he continued to require Cardene drip.  Patient required carvedilol, nifedipine, chlorthalidone, clonidine, Imdur, Cardura, and spironolactone.  Echo showed low EF 40 to 45% and was evaluated by cardiology.  Cardiology recommended outpatient workup.  Patient was also seen by nephrology for the concerns of JARED on CKD.  Patient was initially treated for JARED , although during the course of hospitalization it was evident that his CKD has progressed.  Patient

## 2025-06-29 LAB
METANEPH/PLASMA INTERP: ABNORMAL
METANEPHRINE: 0.43 NMOL/L (ref 0–0.49)
NORMETANEPHRINE PLASMA: 3.81 NMOL/L (ref 0–0.89)

## 2025-07-10 LAB
COLLECT DURATION TIME SPEC: ABNORMAL H
CREAT 24H UR-MCNC: 51 MG/DL
CREATININE URINE /24 HR: ABNORMAL MG/D (ref 800–2100)
METANEPHRINE UF INTERPRETATION: ABNORMAL
METANEPHRINE UG/G CRE: 286 UG/G CRT (ref 0–300)
METANEPHRINES 24 HOUR URINE: ABNORMAL UG/D (ref 55–320)
METANEPHRINES, URINE (UMOL/L): 146 UG/L
NORMETANEPHRINE, (G/CRT): 1208 UG/G CRT (ref 0–400)
NORMETANEPHRINE, (NMOL/DAY): ABNORMAL UG/D (ref 114–865)
NORMETANEPHRINES, NMOL/L: 616 UG/L
SPECIMEN VOL ?TM UR: ABNORMAL ML

## 2025-07-25 ENCOUNTER — HOSPITAL ENCOUNTER (OUTPATIENT)
Age: 65
Discharge: HOME | End: 2025-07-25
Payer: MEDICARE

## 2025-07-25 LAB
ANION GAP SERPL CALCULATED.3IONS-SCNC: 14 MMOL/L (ref 9–16)
BASOPHILS # BLD: 0.06 K/UL (ref 0–0.2)
BASOPHILS NFR BLD: 1 % (ref 0–2)
BUN SERPL-MCNC: 71 MG/DL (ref 8–23)
CALCIUM SERPL-MCNC: 9.5 MG/DL (ref 8.6–10.4)
CHLORIDE SERPL-SCNC: 106 MMOL/L (ref 98–107)
CO2 SERPL-SCNC: 21 MMOL/L (ref 20–31)
CREAT SERPL-MCNC: 5 MG/DL (ref 0.7–1.2)
CREAT UR-MCNC: 162 MG/DL (ref 39–259)
EOSINOPHIL # BLD: 0.24 K/UL (ref 0–0.44)
EOSINOPHILS RELATIVE PERCENT: 5 % (ref 1–4)
ERYTHROCYTE [DISTWIDTH] IN BLOOD BY AUTOMATED COUNT: 12.1 % (ref 11.8–14.4)
GFR, ESTIMATED: 12 ML/MIN/1.73M2
GLUCOSE SERPL-MCNC: 88 MG/DL (ref 74–99)
HCT VFR BLD AUTO: 31.8 % (ref 40.7–50.3)
HGB BLD-MCNC: 10.3 G/DL (ref 13–17)
IMM GRANULOCYTES # BLD AUTO: <0.03 K/UL (ref 0–0.3)
IMM GRANULOCYTES NFR BLD: 0 %
LYMPHOCYTES NFR BLD: 2.17 K/UL (ref 1.1–3.7)
LYMPHOCYTES RELATIVE PERCENT: 41 % (ref 24–43)
MCH RBC QN AUTO: 32.8 PG (ref 25.2–33.5)
MCHC RBC AUTO-ENTMCNC: 32.4 G/DL (ref 28.4–34.8)
MCV RBC AUTO: 101.3 FL (ref 82.6–102.9)
MONOCYTES NFR BLD: 0.58 K/UL (ref 0.1–1.2)
MONOCYTES NFR BLD: 11 % (ref 3–12)
NEUTROPHILS NFR BLD: 42 % (ref 36–65)
NEUTS SEG NFR BLD: 2.22 K/UL (ref 1.5–8.1)
NRBC BLD-RTO: 0 PER 100 WBC
PLATELET # BLD AUTO: 234 K/UL (ref 138–453)
PMV BLD AUTO: 9.4 FL (ref 8.1–13.5)
POTASSIUM SERPL-SCNC: 4.9 MMOL/L (ref 3.7–5.3)
RBC # BLD AUTO: 3.14 M/UL (ref 4.21–5.77)
SODIUM SERPL-SCNC: 141 MMOL/L (ref 136–145)
TOTAL PROTEIN, URINE: 79 MG/DL
WBC OTHER # BLD: 5.3 K/UL (ref 3.5–11.3)

## 2025-07-25 PROCEDURE — 82570 ASSAY OF URINE CREATININE: CPT

## 2025-07-25 PROCEDURE — 80048 BASIC METABOLIC PNL TOTAL CA: CPT

## 2025-07-25 PROCEDURE — 85025 COMPLETE CBC W/AUTO DIFF WBC: CPT

## 2025-07-25 PROCEDURE — 84156 ASSAY OF PROTEIN URINE: CPT

## 2025-07-25 PROCEDURE — 36415 COLL VENOUS BLD VENIPUNCTURE: CPT

## 2025-07-29 ENCOUNTER — HOSPITAL ENCOUNTER (INPATIENT)
Age: 65
LOS: 2 days | Discharge: HOME | DRG: 683 | End: 2025-07-31
Admitting: STUDENT IN AN ORGANIZED HEALTH CARE EDUCATION/TRAINING PROGRAM
Payer: MEDICARE

## 2025-07-29 ENCOUNTER — APPOINTMENT (OUTPATIENT)
Dept: CT IMAGING | Age: 65
DRG: 683 | End: 2025-07-29
Payer: MEDICARE

## 2025-07-29 DIAGNOSIS — N18.4 STAGE 4 CHRONIC KIDNEY DISEASE (HCC): ICD-10-CM

## 2025-07-29 DIAGNOSIS — N17.9 AKI (ACUTE KIDNEY INJURY): Primary | ICD-10-CM

## 2025-07-29 LAB
ALBUMIN SERPL-MCNC: 3.9 G/DL (ref 3.5–5.2)
ALBUMIN/GLOB SERPL: 1.3 {RATIO} (ref 1–2.5)
ALP SERPL-CCNC: 82 U/L (ref 40–129)
ALT SERPL-CCNC: 8 U/L (ref 10–50)
ANION GAP SERPL CALCULATED.3IONS-SCNC: 12 MMOL/L (ref 9–16)
AST SERPL-CCNC: 17 U/L (ref 10–50)
BASOPHILS # BLD: 0.03 K/UL (ref 0–0.2)
BASOPHILS NFR BLD: 1 % (ref 0–2)
BILIRUB DIRECT SERPL-MCNC: <0.1 MG/DL (ref 0–0.2)
BILIRUB INDIRECT SERPL-MCNC: ABNORMAL MG/DL
BILIRUB SERPL-MCNC: <0.2 MG/DL (ref 0–1.2)
BILIRUB UR QL STRIP: NEGATIVE
BUN SERPL-MCNC: 65 MG/DL (ref 8–23)
CALCIUM SERPL-MCNC: 9.3 MG/DL (ref 8.8–10.2)
CHLORIDE SERPL-SCNC: 105 MMOL/L (ref 98–107)
CHLORIDE UR-SCNC: 68 MMOL/L
CLARITY UR: CLEAR
CO2 SERPL-SCNC: 23 MMOL/L (ref 20–31)
COLOR UR: YELLOW
CREAT SERPL-MCNC: 4.1 MG/DL (ref 0.7–1.2)
CREAT UR-MCNC: 145 MG/DL (ref 39–259)
EOSINOPHIL # BLD: 0.18 K/UL (ref 0–0.44)
EOSINOPHILS RELATIVE PERCENT: 4 % (ref 1–4)
EPI CELLS #/AREA URNS HPF: NORMAL /HPF (ref 0–5)
ERYTHROCYTE [DISTWIDTH] IN BLOOD BY AUTOMATED COUNT: 11.9 % (ref 11.8–14.4)
GFR, ESTIMATED: 15 ML/MIN/1.73M2
GLUCOSE SERPL-MCNC: 145 MG/DL (ref 82–115)
GLUCOSE UR STRIP-MCNC: NEGATIVE MG/DL
HCT VFR BLD AUTO: 29.4 % (ref 40.7–50.3)
HGB BLD-MCNC: 9.8 G/DL (ref 13–17)
HGB UR QL STRIP.AUTO: NEGATIVE
IMM GRANULOCYTES # BLD AUTO: 0.01 K/UL (ref 0–0.3)
IMM GRANULOCYTES NFR BLD: 0 %
KETONES UR STRIP-MCNC: NEGATIVE MG/DL
LAMOTRIGINE SERPL-MCNC: <1 UG/ML (ref 3–15)
LEUKOCYTE ESTERASE UR QL STRIP: NEGATIVE
LIPASE SERPL-CCNC: 90 U/L (ref 13–60)
LYMPHOCYTES NFR BLD: 1.48 K/UL (ref 1.1–3.7)
LYMPHOCYTES RELATIVE PERCENT: 29 % (ref 24–43)
MCH RBC QN AUTO: 33.7 PG (ref 25.2–33.5)
MCHC RBC AUTO-ENTMCNC: 33.3 G/DL (ref 28.4–34.8)
MCV RBC AUTO: 101 FL (ref 82.6–102.9)
MONOCYTES NFR BLD: 0.5 K/UL (ref 0.1–1.2)
MONOCYTES NFR BLD: 10 % (ref 3–12)
NEUTROPHILS NFR BLD: 56 % (ref 36–65)
NEUTS SEG NFR BLD: 2.83 K/UL (ref 1.5–8.1)
NITRITE UR QL STRIP: NEGATIVE
NRBC BLD-RTO: 0 PER 100 WBC
OSMOLALITY UR: 471 MOSM/KG (ref 80–1300)
PH UR STRIP: 6 [PH] (ref 5–8)
PLATELET # BLD AUTO: 188 K/UL (ref 138–453)
PMV BLD AUTO: 9.7 FL (ref 8.1–13.5)
POTASSIUM SERPL-SCNC: 4.2 MMOL/L (ref 3.7–5.3)
POTASSIUM, UR: 29.8 MMOL/L
PROT SERPL-MCNC: 6.9 G/DL (ref 6.6–8.7)
PROT UR STRIP-MCNC: ABNORMAL MG/DL
RBC # BLD AUTO: 2.91 M/UL (ref 4.21–5.77)
RBC #/AREA URNS HPF: NORMAL /HPF (ref 0–2)
SODIUM SERPL-SCNC: 140 MMOL/L (ref 136–145)
SODIUM UR-SCNC: 94 MMOL/L
SP GR UR STRIP: 1.01 (ref 1–1.03)
UROBILINOGEN UR STRIP-ACNC: NORMAL EU/DL (ref 0–1)
UUN UR-MCNC: 594 MG/DL
WBC #/AREA URNS HPF: NORMAL /HPF (ref 0–5)
WBC OTHER # BLD: 5 K/UL (ref 3.5–11.3)

## 2025-07-29 PROCEDURE — 1200000000 HC SEMI PRIVATE

## 2025-07-29 PROCEDURE — 82570 ASSAY OF URINE CREATININE: CPT

## 2025-07-29 PROCEDURE — 84133 ASSAY OF URINE POTASSIUM: CPT

## 2025-07-29 PROCEDURE — 84540 ASSAY OF URINE/UREA-N: CPT

## 2025-07-29 PROCEDURE — 80076 HEPATIC FUNCTION PANEL: CPT

## 2025-07-29 PROCEDURE — 2580000003 HC RX 258: Performed by: STUDENT IN AN ORGANIZED HEALTH CARE EDUCATION/TRAINING PROGRAM

## 2025-07-29 PROCEDURE — 82436 ASSAY OF URINE CHLORIDE: CPT

## 2025-07-29 PROCEDURE — 83935 ASSAY OF URINE OSMOLALITY: CPT

## 2025-07-29 PROCEDURE — 80175 DRUG SCREEN QUAN LAMOTRIGINE: CPT

## 2025-07-29 PROCEDURE — 80048 BASIC METABOLIC PNL TOTAL CA: CPT

## 2025-07-29 PROCEDURE — 93005 ELECTROCARDIOGRAM TRACING: CPT | Performed by: STUDENT IN AN ORGANIZED HEALTH CARE EDUCATION/TRAINING PROGRAM

## 2025-07-29 PROCEDURE — 6360000002 HC RX W HCPCS: Performed by: STUDENT IN AN ORGANIZED HEALTH CARE EDUCATION/TRAINING PROGRAM

## 2025-07-29 PROCEDURE — 51798 US URINE CAPACITY MEASURE: CPT

## 2025-07-29 PROCEDURE — 99222 1ST HOSP IP/OBS MODERATE 55: CPT | Performed by: STUDENT IN AN ORGANIZED HEALTH CARE EDUCATION/TRAINING PROGRAM

## 2025-07-29 PROCEDURE — 83690 ASSAY OF LIPASE: CPT

## 2025-07-29 PROCEDURE — 85025 COMPLETE CBC W/AUTO DIFF WBC: CPT

## 2025-07-29 PROCEDURE — 36415 COLL VENOUS BLD VENIPUNCTURE: CPT

## 2025-07-29 PROCEDURE — 6370000000 HC RX 637 (ALT 250 FOR IP): Performed by: STUDENT IN AN ORGANIZED HEALTH CARE EDUCATION/TRAINING PROGRAM

## 2025-07-29 PROCEDURE — 96360 HYDRATION IV INFUSION INIT: CPT

## 2025-07-29 PROCEDURE — 81001 URINALYSIS AUTO W/SCOPE: CPT

## 2025-07-29 PROCEDURE — 84300 ASSAY OF URINE SODIUM: CPT

## 2025-07-29 PROCEDURE — 74176 CT ABD & PELVIS W/O CONTRAST: CPT

## 2025-07-29 PROCEDURE — 2580000003 HC RX 258: Performed by: PHYSICIAN ASSISTANT

## 2025-07-29 PROCEDURE — 99285 EMERGENCY DEPT VISIT HI MDM: CPT

## 2025-07-29 RX ORDER — ATORVASTATIN CALCIUM 40 MG/1
40 TABLET, FILM COATED ORAL NIGHTLY
Status: DISCONTINUED | OUTPATIENT
Start: 2025-07-29 | End: 2025-07-31 | Stop reason: HOSPADM

## 2025-07-29 RX ORDER — ONDANSETRON 4 MG/1
4 TABLET, ORALLY DISINTEGRATING ORAL EVERY 8 HOURS PRN
Status: DISCONTINUED | OUTPATIENT
Start: 2025-07-29 | End: 2025-07-31 | Stop reason: HOSPADM

## 2025-07-29 RX ORDER — SODIUM CHLORIDE 0.9 % (FLUSH) 0.9 %
5-40 SYRINGE (ML) INJECTION EVERY 12 HOURS SCHEDULED
Status: DISCONTINUED | OUTPATIENT
Start: 2025-07-29 | End: 2025-07-31 | Stop reason: HOSPADM

## 2025-07-29 RX ORDER — HYDRALAZINE HYDROCHLORIDE 50 MG/1
100 TABLET, FILM COATED ORAL EVERY 8 HOURS SCHEDULED
Status: DISCONTINUED | OUTPATIENT
Start: 2025-07-29 | End: 2025-07-31 | Stop reason: HOSPADM

## 2025-07-29 RX ORDER — PANTOPRAZOLE SODIUM 40 MG/1
40 TABLET, DELAYED RELEASE ORAL DAILY
Status: ON HOLD | COMMUNITY
End: 2025-07-31 | Stop reason: HOSPADM

## 2025-07-29 RX ORDER — SODIUM CHLORIDE 0.9 % (FLUSH) 0.9 %
5-40 SYRINGE (ML) INJECTION PRN
Status: DISCONTINUED | OUTPATIENT
Start: 2025-07-29 | End: 2025-07-31 | Stop reason: HOSPADM

## 2025-07-29 RX ORDER — ASPIRIN 81 MG/1
81 TABLET ORAL DAILY
Status: DISCONTINUED | OUTPATIENT
Start: 2025-07-29 | End: 2025-07-31 | Stop reason: HOSPADM

## 2025-07-29 RX ORDER — LAMOTRIGINE 100 MG/1
100 TABLET ORAL 2 TIMES DAILY
Status: DISCONTINUED | OUTPATIENT
Start: 2025-07-29 | End: 2025-07-31 | Stop reason: HOSPADM

## 2025-07-29 RX ORDER — ENOXAPARIN SODIUM 100 MG/ML
30 INJECTION SUBCUTANEOUS DAILY
Status: DISCONTINUED | OUTPATIENT
Start: 2025-07-29 | End: 2025-07-30

## 2025-07-29 RX ORDER — NIFEDIPINE 30 MG/1
90 TABLET, EXTENDED RELEASE ORAL DAILY
Status: DISCONTINUED | OUTPATIENT
Start: 2025-07-29 | End: 2025-07-31 | Stop reason: HOSPADM

## 2025-07-29 RX ORDER — NIFEDIPINE 90 MG/1
90 TABLET, FILM COATED, EXTENDED RELEASE ORAL DAILY
COMMUNITY

## 2025-07-29 RX ORDER — DOXAZOSIN 1 MG/1
2 TABLET ORAL DAILY
Status: DISCONTINUED | OUTPATIENT
Start: 2025-07-29 | End: 2025-07-31 | Stop reason: HOSPADM

## 2025-07-29 RX ORDER — NIFEDIPINE 90 MG/1
90 TABLET, FILM COATED, EXTENDED RELEASE ORAL DAILY
Status: ON HOLD | COMMUNITY
End: 2025-07-29

## 2025-07-29 RX ORDER — ONDANSETRON 2 MG/ML
4 INJECTION INTRAMUSCULAR; INTRAVENOUS EVERY 6 HOURS PRN
Status: DISCONTINUED | OUTPATIENT
Start: 2025-07-29 | End: 2025-07-31 | Stop reason: HOSPADM

## 2025-07-29 RX ORDER — SODIUM CHLORIDE 9 MG/ML
INJECTION, SOLUTION INTRAVENOUS PRN
Status: DISCONTINUED | OUTPATIENT
Start: 2025-07-29 | End: 2025-07-31 | Stop reason: HOSPADM

## 2025-07-29 RX ORDER — 0.9 % SODIUM CHLORIDE 0.9 %
1000 INTRAVENOUS SOLUTION INTRAVENOUS ONCE
Status: COMPLETED | OUTPATIENT
Start: 2025-07-29 | End: 2025-07-29

## 2025-07-29 RX ORDER — CLONIDINE HYDROCHLORIDE 0.1 MG/1
0.1 TABLET ORAL 4 TIMES DAILY
Status: DISCONTINUED | OUTPATIENT
Start: 2025-07-29 | End: 2025-07-31 | Stop reason: HOSPADM

## 2025-07-29 RX ORDER — SODIUM BICARBONATE 650 MG/1
1300 TABLET ORAL 4 TIMES DAILY
Status: DISCONTINUED | OUTPATIENT
Start: 2025-07-29 | End: 2025-07-31 | Stop reason: HOSPADM

## 2025-07-29 RX ORDER — ACETAMINOPHEN 650 MG/1
650 SUPPOSITORY RECTAL EVERY 6 HOURS PRN
Status: DISCONTINUED | OUTPATIENT
Start: 2025-07-29 | End: 2025-07-31 | Stop reason: HOSPADM

## 2025-07-29 RX ORDER — SODIUM CHLORIDE 9 MG/ML
INJECTION, SOLUTION INTRAVENOUS CONTINUOUS
Status: ACTIVE | OUTPATIENT
Start: 2025-07-29 | End: 2025-07-30

## 2025-07-29 RX ORDER — FERROUS SULFATE 325(65) MG
325 TABLET, DELAYED RELEASE (ENTERIC COATED) ORAL
Status: DISCONTINUED | OUTPATIENT
Start: 2025-07-30 | End: 2025-07-31 | Stop reason: HOSPADM

## 2025-07-29 RX ORDER — ACETAMINOPHEN 325 MG/1
650 TABLET ORAL EVERY 6 HOURS PRN
Status: DISCONTINUED | OUTPATIENT
Start: 2025-07-29 | End: 2025-07-31 | Stop reason: HOSPADM

## 2025-07-29 RX ORDER — ISOSORBIDE MONONITRATE 60 MG/1
60 TABLET, EXTENDED RELEASE ORAL DAILY
Status: DISCONTINUED | OUTPATIENT
Start: 2025-07-29 | End: 2025-07-31 | Stop reason: HOSPADM

## 2025-07-29 RX ADMIN — SODIUM CHLORIDE 1000 ML: 0.9 INJECTION, SOLUTION INTRAVENOUS at 11:45

## 2025-07-29 RX ADMIN — ENOXAPARIN SODIUM 30 MG: 100 INJECTION SUBCUTANEOUS at 17:30

## 2025-07-29 RX ADMIN — NIFEDIPINE 90 MG: 30 TABLET, FILM COATED, EXTENDED RELEASE ORAL at 17:29

## 2025-07-29 RX ADMIN — CLONIDINE HYDROCHLORIDE 0.1 MG: 0.1 TABLET ORAL at 20:17

## 2025-07-29 RX ADMIN — DOXAZOSIN 2 MG: 1 TABLET ORAL at 18:38

## 2025-07-29 RX ADMIN — CARVEDILOL 37.5 MG: 25 TABLET, FILM COATED ORAL at 17:30

## 2025-07-29 RX ADMIN — ATORVASTATIN CALCIUM 40 MG: 40 TABLET, FILM COATED ORAL at 20:16

## 2025-07-29 RX ADMIN — ASPIRIN 81 MG: 81 TABLET, DELAYED RELEASE ORAL at 17:30

## 2025-07-29 RX ADMIN — HYDRALAZINE HYDROCHLORIDE 100 MG: 50 TABLET ORAL at 20:17

## 2025-07-29 RX ADMIN — LAMOTRIGINE 100 MG: 100 TABLET ORAL at 22:43

## 2025-07-29 RX ADMIN — SODIUM BICARBONATE 1300 MG: 650 TABLET ORAL at 20:16

## 2025-07-29 RX ADMIN — SODIUM CHLORIDE: 0.9 INJECTION, SOLUTION INTRAVENOUS at 17:51

## 2025-07-29 RX ADMIN — ISOSORBIDE MONONITRATE 60 MG: 60 TABLET, EXTENDED RELEASE ORAL at 17:30

## 2025-07-29 ASSESSMENT — PAIN SCALES - GENERAL: PAINLEVEL_OUTOF10: 0

## 2025-07-29 NOTE — ED PROVIDER NOTES
[facility] EMERGENCY DEPARTMENT  [facility]      Pt Name: Maldonado Geiger  MRN: [id number]  Birthdate 1960  Date ofevaluation: 7/29/2025  Provider: Dustin Snowden PA-C    CHIEF COMPLAINT       Chief Complaint   Patient presents with    Abnormal Lab     Sent to ED for CR of 5.0         HISTORY OF PRESENT ILLNESS  (Location/Symptom, Timing/Onset, Context/Setting, Quality, Duration, Modifying Factors, Severity.)   Maldonado Geiger is a 65 y.o. male who presents to the emergency department with   Elevated creatinine.  Elevated creatinine.  Reports weakness and fatigue at times.  Denies any chest pain shortness of breath.  Outpatient labs with creatinine of 5.  Was above his baseline.  History of chronic kidney disease.      Nursing Notes were reviewed.    ALLERGIES     Patient has no known allergies.    CURRENT MEDICATIONS       Previous Medications    ASPIRIN EC 81 MG EC TABLET    Take 1 tablet by mouth daily    ATORVASTATIN (LIPITOR) 40 MG TABLET    Take 1 tablet by mouth nightly    CARVEDILOL (COREG) 12.5 MG TABLET    Take 3 tablets by mouth 2 times daily (with meals)    CHLORTHALIDONE (HYGROTON) 25 MG TABLET    Take 1 tablet by mouth daily    CLONIDINE (CATAPRES) 0.1 MG TABLET    Take 1 tablet by mouth 4 times daily    DOXAZOSIN (CARDURA) 2 MG TABLET    Take 1 tablet by mouth daily    ERGOCALCIFEROL (VITAMIN D) 18003 UNITS CAPS    Take 50,000 Units by mouth once a week for 8 doses    FERROUS SULFATE (FE TABS 325) 325 (65 FE) MG EC TABLET    Take 1 tablet by mouth daily (with breakfast)    HYDRALAZINE (APRESOLINE) 100 MG TABLET    Take 1 tablet by mouth every 8 hours    ISOSORBIDE MONONITRATE (IMDUR) 60 MG EXTENDED RELEASE TABLET    Take 1 tablet by mouth daily    LAMOTRIGINE (LAMICTAL) 25 MG TABLET    Week 1 and 2: 25 mg/day; Weeks 3 and 4: 50 mg/day; Week 5: 50 mg BID, Week 6: 50 mg am and 100 mg HS, Week 7: 100 mg BID    NIFEDIPINE (PROCARDIA XL) 30 MG EXTENDED RELEASE TABLET

## 2025-07-29 NOTE — PROGRESS NOTES
Pharmacy Medication Review    The patient's list of current home medications has been reviewed.     PHYSICIANS AND NURSE PRACTITIONERS: please note there is no Transitions of Care/Med Rec Pharmacist available to address any discrepancies on inpatient orders. It is the responsibility of the attending provider to review the updates made to the home med list and adjust inpatient orders as appropriate.        Source(s) of information:Care Everywhere, Surescripts refill report        Based on information provided by the above source(s), I have updated the patient's home med list as described below.     Removed   NIFEdipine (PROCARDIA XL) 30 MG extended release tablet     Added pantoprazole (PROTONIX) 40 MG tablet   vitamin D (CHOLECALCIFEROL) 25 MCG (1000 UT) TABS tablet   NIFEdipine (ADALAT CC) 90 MG extended release tablet      Adjusted   None      Other notes:   None    Are any of the medications noted above considered a 'high alert' medication? no      Is the patient on warfarin at home? No          Please feel free to call me with any questions about this encounter. Thank you.      Francisca Ta, pharmacy technician  [facility]  Phone:  [phone]      Electronically signed by Francisca Ta on 7/29/2025 at 6:01 PM   Note: co-signature by the pharmacist only acknowledges that I have performed a medication review and does not attest to an evaluation of this medication review.    Prior to Admission medications    Medication Sig   NIFEdipine (ADALAT CC) 90 MG extended release tablet Take 1 tablet by mouth daily   pantoprazole (PROTONIX) 40 MG tablet Take 1 tablet by mouth daily   vitamin D (CHOLECALCIFEROL) 25 MCG (1000 UT) TABS tablet Take 1 tablet by mouth daily   NIFEdipine (ADALAT CC) 90 MG extended release tablet Take 1 tablet by mouth daily   sodium bicarbonate 650 MG tablet Take 2 tablets by mouth 4 times daily     isosorbide mononitrate (IMDUR) 60 MG extended release tablet Take 1 tablet by  mouth daily   doxazosin (CARDURA) 2 MG tablet Take 1 tablet by mouth daily   chlorthalidone (HYGROTON) 25 MG tablet Take 1 tablet by mouth daily   spironolactone (ALDACTONE) 25 MG tablet Take 1 tablet by mouth daily   ferrous sulfate (FE TABS 325) 325 (65 Fe) MG EC tablet Take 1 tablet by mouth daily (with breakfast)   Ergocalciferol (VITAMIN D) 61426 units CAPS Take 50,000 Units by mouth once a week for 8 doses   carvedilol (COREG) 12.5 MG tablet Take 3 tablets by mouth 2 times daily (with meals)   cloNIDine (CATAPRES) 0.1 MG tablet Take 1 tablet by mouth 4 times daily   lamoTRIgine (LAMICTAL) 25 MG tablet Week 1 and 2: 25 mg/day; Weeks 3 and 4: 50 mg/day; Week 5: 50 mg BID, Week 6: 50 mg am and 100 mg HS, Week 7: 100 mg BID     phenytoin (DILANTIN) 100 MG ER capsule Take 1 capsule by mouth 3 times daily   atorvastatin (LIPITOR) 40 MG tablet Take 1 tablet by mouth nightly   hydrALAZINE (APRESOLINE) 100 MG tablet Take 1 tablet by mouth 3 times daily   aspirin EC 81 MG EC tablet Take 1 tablet by mouth daily

## 2025-07-29 NOTE — H&P
[facility]  Office: [phone]  Kris Loyd, DO, Anup Mantilla, DO, Pratik Yeung DO, Blaze Valerio, DO, Shayna Blount MD, Eun Castellano MD, Tristian Grullon MD, Ninoska Remy MD,  Jaime Perry MD, Kendal Milligan MD, Ernie Corey MD,  Joanna Husain DO, Francis Loyd DO, Winnie Colin MD,  Luis Felipe Arnold DO, Rachel Miranda MD, Ratna Call MD, Quinn Langston MD,  Laci Burton MD, Tani Dexter MD, Savanah Diamond MD, [place] Sow MD, Tyler Keyes MD, Chris Layne MD, Gary Montano DO, Kira Garcia MD, Amanda Moran DO, Adelso Giles MD, Momo Heath MD, Joanna Tafoya MD, Mohsin Reza, MD, Lindsey Muñoz MD, Shirley Waterhouse, CNP,  Rita Leary, CNP, Gary Friend, CNP,  Makayla Salazar, DNP, Farnaz Torres, CNP, Lisa Delgadillo, CNP, Yareli Almanza, CNP, Sarah Bliss, CNP, Lelia Olivera, PA-C, Maile Quinonez, CNP, Adrian Davis, CNP,  Ashley Figueroa, CNP, Sera Hunt, CNP, Jeffy Dodson, PA-C, Ana Tse, PA-C, Tami Lala, CNP,        Sonam Sadler, CNS, Aditi Nance, CNP, Ana Booker, CNP         [facility]   IN-PATIENT SERVICE   [facility]    HISTORY AND PHYSICAL EXAMINATION            Date:   7/29/2025  Patient name:  Maldonado Geiger  Date of admission:  7/29/2025 11:09 AM  MRN:   [id number]  Account:  [id number]  YOB: 1960  PCP:    Fabi Anders MD  Room:   [place] [address]  Code Status:    DNR-CC    Chief Complaint:     Chief Complaint   Patient presents with    Abnormal Lab     Sent to ED for CR of 5.0       History Obtained From:     patient, electronic medical record    History of Present Illness:     Maldonado Geiger is a 65 y.o. Non- / non  male who presents with Abnormal Lab (Sent to ED for CR of 5.0)   and is admitted to the hospital for the management of JARED (acute kidney injury).    65-year-old male with a past medical history of essential hypertension, seizure disorder, and stage IV kidney  Auto Differential    Collection Time: 07/29/25 11:45 AM   Result Value Ref Range    WBC 5.0 3.5 - 11.3 k/uL    RBC 2.91 (L) 4.21 - 5.77 m/uL    Hemoglobin 9.8 (L) 13.0 - 17.0 g/dL    Hematocrit 29.4 (L) 40.7 - 50.3 %    .0 82.6 - 102.9 fL    MCH 33.7 (H) 25.2 - 33.5 pg    MCHC 33.3 28.4 - 34.8 g/dL    RDW 11.9 11.8 - 14.4 %    Platelets 188 138 - 453 k/uL    MPV 9.7 8.1 - 13.5 fL    NRBC Automated 0.0 0.0 per 100 WBC    Neutrophils % 56 36 - 65 %    Lymphocytes % 29 24 - 43 %    Monocytes % 10 3 - 12 %    Eosinophils % 4 1 - 4 %    Basophils % 1 0 - 2 %    Immature Granulocytes % 0 0 %    Neutrophils Absolute 2.83 1.50 - 8.10 k/uL    Lymphocytes Absolute 1.48 1.10 - 3.70 k/uL    Monocytes Absolute 0.50 0.10 - 1.20 k/uL    Eosinophils Absolute 0.18 0.00 - 0.44 k/uL    Basophils Absolute 0.03 0.00 - 0.20 k/uL    Immature Granulocytes Absolute 0.01 0.00 - 0.30 k/uL   Basic Metabolic Panel    Collection Time: 07/29/25 11:45 AM   Result Value Ref Range    Sodium 140 136 - 145 mmol/L    Potassium 4.2 3.7 - 5.3 mmol/L    Chloride 105 98 - 107 mmol/L    CO2 23 20 - 31 mmol/L    Anion Gap 12 9 - 16 mmol/L    Glucose 145 (H) 82 - 115 mg/dL    BUN 65 (H) 8 - 23 mg/dL    Creatinine 4.1 (H) 0.70 - 1.20 mg/dL    Est, Glom Filt Rate 15 (L) >60 mL/min/1.73m2    Calcium 9.3 8.8 - 10.2 mg/dL   Hepatic Function Panel    Collection Time: 07/29/25 11:45 AM   Result Value Ref Range    Albumin 3.9 3.5 - 5.2 g/dL    Alkaline Phosphatase 82 40 - 129 U/L    ALT 8 (L) 10 - 50 U/L    AST 17 10 - 50 U/L    Total Bilirubin <0.2 0.00 - 1.20 mg/dL    Bilirubin, Direct <0.1 0.00 - 0.20 mg/dL    Bilirubin, Indirect Can not be calculated mg/dL    Total Protein 6.9 6.6 - 8.7 g/dL    Albumin/Globulin Ratio 1.3 1.0 - 2.5   Lipase    Collection Time: 07/29/25 11:45 AM   Result Value Ref Range    Lipase 90 (H) 13 - 60 U/L   Urinalysis    Collection Time: 07/29/25 12:05 PM   Result Value Ref Range    Color, UA Yellow Yellow    Turbidity UA Clear

## 2025-07-30 PROBLEM — N40.0 BPH (BENIGN PROSTATIC HYPERPLASIA): Status: ACTIVE | Noted: 2025-07-30

## 2025-07-30 PROBLEM — E43 SEVERE MALNUTRITION: Status: ACTIVE | Noted: 2025-07-30

## 2025-07-30 LAB
ANION GAP SERPL CALCULATED.3IONS-SCNC: 11 MMOL/L (ref 9–16)
BUN SERPL-MCNC: 53 MG/DL (ref 8–23)
CALCIUM SERPL-MCNC: 8.8 MG/DL (ref 8.8–10.2)
CHLORIDE SERPL-SCNC: 109 MMOL/L (ref 98–107)
CO2 SERPL-SCNC: 21 MMOL/L (ref 20–31)
CREAT SERPL-MCNC: 3.4 MG/DL (ref 0.7–1.2)
EKG ATRIAL RATE: 76 BPM
EKG P AXIS: 73 DEGREES
EKG P-R INTERVAL: 186 MS
EKG Q-T INTERVAL: 410 MS
EKG QRS DURATION: 92 MS
EKG QTC CALCULATION (BAZETT): 461 MS
EKG R AXIS: -34 DEGREES
EKG T AXIS: -27 DEGREES
EKG VENTRICULAR RATE: 76 BPM
GFR, ESTIMATED: 20 ML/MIN/1.73M2
GLUCOSE SERPL-MCNC: 92 MG/DL (ref 82–115)
POTASSIUM SERPL-SCNC: 4 MMOL/L (ref 3.7–5.3)
SODIUM SERPL-SCNC: 142 MMOL/L (ref 136–145)

## 2025-07-30 PROCEDURE — 36415 COLL VENOUS BLD VENIPUNCTURE: CPT

## 2025-07-30 PROCEDURE — 2580000003 HC RX 258: Performed by: STUDENT IN AN ORGANIZED HEALTH CARE EDUCATION/TRAINING PROGRAM

## 2025-07-30 PROCEDURE — 1200000000 HC SEMI PRIVATE

## 2025-07-30 PROCEDURE — 80048 BASIC METABOLIC PNL TOTAL CA: CPT

## 2025-07-30 PROCEDURE — 6360000002 HC RX W HCPCS: Performed by: INTERNAL MEDICINE

## 2025-07-30 PROCEDURE — 99232 SBSQ HOSP IP/OBS MODERATE 35: CPT | Performed by: INTERNAL MEDICINE

## 2025-07-30 PROCEDURE — 6370000000 HC RX 637 (ALT 250 FOR IP): Performed by: STUDENT IN AN ORGANIZED HEALTH CARE EDUCATION/TRAINING PROGRAM

## 2025-07-30 RX ORDER — HEPARIN SODIUM 5000 [USP'U]/ML
5000 INJECTION, SOLUTION INTRAVENOUS; SUBCUTANEOUS EVERY 8 HOURS SCHEDULED
Status: DISCONTINUED | OUTPATIENT
Start: 2025-07-30 | End: 2025-07-31 | Stop reason: HOSPADM

## 2025-07-30 RX ADMIN — SODIUM BICARBONATE 1300 MG: 650 TABLET ORAL at 17:28

## 2025-07-30 RX ADMIN — ASPIRIN 81 MG: 81 TABLET, DELAYED RELEASE ORAL at 08:25

## 2025-07-30 RX ADMIN — LAMOTRIGINE 100 MG: 100 TABLET ORAL at 08:20

## 2025-07-30 RX ADMIN — ISOSORBIDE MONONITRATE 60 MG: 60 TABLET, EXTENDED RELEASE ORAL at 08:20

## 2025-07-30 RX ADMIN — CARVEDILOL 37.5 MG: 25 TABLET, FILM COATED ORAL at 08:20

## 2025-07-30 RX ADMIN — HYDRALAZINE HYDROCHLORIDE 100 MG: 50 TABLET ORAL at 05:56

## 2025-07-30 RX ADMIN — SODIUM BICARBONATE 1300 MG: 650 TABLET ORAL at 21:07

## 2025-07-30 RX ADMIN — LAMOTRIGINE 100 MG: 100 TABLET ORAL at 21:07

## 2025-07-30 RX ADMIN — HEPARIN SODIUM 5000 UNITS: 5000 INJECTION INTRAVENOUS; SUBCUTANEOUS at 21:07

## 2025-07-30 RX ADMIN — SODIUM CHLORIDE: 0.9 INJECTION, SOLUTION INTRAVENOUS at 05:57

## 2025-07-30 RX ADMIN — SODIUM BICARBONATE 1300 MG: 650 TABLET ORAL at 08:20

## 2025-07-30 RX ADMIN — HYDRALAZINE HYDROCHLORIDE 100 MG: 50 TABLET ORAL at 14:15

## 2025-07-30 RX ADMIN — ATORVASTATIN CALCIUM 40 MG: 40 TABLET, FILM COATED ORAL at 21:07

## 2025-07-30 RX ADMIN — SODIUM BICARBONATE 1300 MG: 650 TABLET ORAL at 14:15

## 2025-07-30 RX ADMIN — FERROUS SULFATE TAB EC 325 MG (65 MG FE EQUIVALENT) 325 MG: 325 (65 FE) TABLET DELAYED RESPONSE at 08:20

## 2025-07-30 RX ADMIN — CLONIDINE HYDROCHLORIDE 0.1 MG: 0.1 TABLET ORAL at 17:30

## 2025-07-30 RX ADMIN — CLONIDINE HYDROCHLORIDE 0.1 MG: 0.1 TABLET ORAL at 14:15

## 2025-07-30 RX ADMIN — DOXAZOSIN 2 MG: 1 TABLET ORAL at 08:20

## 2025-07-30 RX ADMIN — NIFEDIPINE 90 MG: 30 TABLET, FILM COATED, EXTENDED RELEASE ORAL at 08:20

## 2025-07-30 RX ADMIN — CLONIDINE HYDROCHLORIDE 0.1 MG: 0.1 TABLET ORAL at 08:20

## 2025-07-30 RX ADMIN — CARVEDILOL 37.5 MG: 25 TABLET, FILM COATED ORAL at 17:30

## 2025-07-30 NOTE — PROGRESS NOTES
Dr Guerrero paged to inquired as to whether pt will have cysto today. He has been NPO since midnight in preparation

## 2025-07-30 NOTE — PROGRESS NOTES
Comprehensive Nutrition Assessment    Type and Reason for Visit:  Positive nutrition screen (Unplanned weight loss and decreased appetite)    Nutrition Recommendations/Plan:   ADULT DIET; Regular; 3 carb choices (45 gm/meal)   Start Glucerna 3x/day  Monitor p.o intakes and labs     Malnutrition Assessment:  Malnutrition Status:  Severe malnutrition (07/30/25 1555)    Context:  Chronic Illness     Findings of the 6 clinical characteristics of malnutrition:  Energy Intake:  75% or less estimated energy requirements for 1 month or longer  Weight Loss:  Greater than 20% over 1 year     Body Fat Loss:  Severe body fat loss Fat Overlying Ribs   Muscle Mass Loss:  Unable to assess    Fluid Accumulation:  No fluid accumulation Extremities   Strength:  Not Performed    Nutrition Assessment:    Patient admission is related to JARED with an elevated creatinine at 5.0 (H). Patient was NPO at midnight for possible cystoscopy but diet has now advanced to Regular; 3 carb choices (45 gm/meal). Positive nutrition screen received for 34 pounds or more and decreased appetite. Patient reports usual body weight is 190-192 lbs. and he started to lose weight about 1 year ago. Current weight is 145 lbs by bed scale. Patient has lost 23.3% of weight over the past year and is severely malnourished. Patient complains that food does not taste good like \"cardboard\". Patient also complained that sometimes food sits on stomach and does not feel like it is digested. Patient does not have teeth and has difficulty chewing meat. Patient sometimes gets oral nutrition supplements when he can afford it. Will start Glucerna 3x/day. Monitor p.o intakes and labs. Continue current diet and monitor p.o intakes and labs.    Nutrition Related Findings:    No edema. Active bowel sounds. Edentulism. Altered taste acuity Wound Type: None       Current Nutrition Intake & Therapies:    Average Meal Intake: Unable to assess  Average Supplements Intake: None

## 2025-07-30 NOTE — PLAN OF CARE
Problem: Chronic Conditions and Co-morbidities  Goal: Patient's chronic conditions and co-morbidity symptoms are monitored and maintained or improved  Outcome: Progressing  Flowsheets (Taken 7/29/2025 2026)  Care Plan - Patient's Chronic Conditions and Co-Morbidity Symptoms are Monitored and Maintained or Improved:   Monitor and assess patient's chronic conditions and comorbid symptoms for stability, deterioration, or improvement   Collaborate with multidisciplinary team to address chronic and comorbid conditions and prevent exacerbation or deterioration   Update acute care plan with appropriate goals if chronic or comorbid symptoms are exacerbated and prevent overall improvement and discharge     Problem: Safety - Adult  Goal: Free from fall injury  Outcome: Progressing  Flowsheets (Taken 7/29/2025 2026)  Free From Fall Injury:   Based on caregiver fall risk screen, instruct family/caregiver to ask for assistance with transferring infant if caregiver noted to have fall risk factors   Instruct family/caregiver on patient safety     Problem: Pain  Goal: Verbalizes/displays adequate comfort level or baseline comfort level  Outcome: Progressing  Flowsheets (Taken 7/29/2025 2026)  Verbalizes/displays adequate comfort level or baseline comfort level:   Notify Licensed Independent Practitioner if interventions unsuccessful or patient reports new pain   Consider cultural and social influences on pain and pain management   Implement non-pharmacological measures as appropriate and evaluate response   Administer analgesics based on type and severity of pain and evaluate response   Assess pain using appropriate pain scale   Encourage patient to monitor pain and request assistance

## 2025-07-30 NOTE — CONSULTS
Nephrology Consult Note    Reason for Consult: Elevated creatinine  Requesting Physician: Dr. Bolunt    Chief Complaint: Abnormal labs  History Obtained From:  patient, electronic medical record    History of Present Illness:              This is a 65 y.o. male who presented abnormal labs.Has a past medical history secondary to traumatic brain injury, patient has seizure disorder secondary to injury and severe impairment of cognitive function as well as memory.  Patient also had right-sided renal cell carcinoma and underwent nephrectomy in 1998.  Patient was recently admitted at [facility] in June 2025 with an elevated blood pressure.  His blood pressure was 190/113.  Patient was treated with Cardene drip and then subsequently discharged on antihypertensive medication.  During that hospitalization it was noticed that patient has advanced stage IIIb to stage IV chronic kidney disease and at the time of discharge his creatinine was 2.9.  He has a follow-up appointment with Dr. Jaeger and he noted that his creatinine was up to 5.0.  He was sent to ER.  In ER he underwent CT scan of abdomen which shows left hydroureter.  Patient was seen and evaluated by urology and there is a possible plan for cystoscopy.  Nephrology was also consulted and I started him on IV fluids.  His creatinine continues to improve and most recent creatinine is 3.4 very close to his baseline.  Patient denies any nausea and vomiting  His long-term girlfriend was present at the bedside and she she states that she is giving medication to him regularly.  He denies any nausea and vomiting.  His appetite is fair.  He is not on any ACE or ARB.    Past Medical History:        Diagnosis Date    BPH (benign prostatic hyperplasia)     CKD (chronic kidney disease) stage 4, GFR 15-29 ml/min ([facility])     Confusion     Head injury     Headache     Hypertension     NSTEMI (non-ST elevated myocardial infarction) ([facility])     Seizure ([facility])     Stroke

## 2025-07-30 NOTE — PROGRESS NOTES
RN had a conversation with patient regarding code status. RN reviewed DNRCC status. Pt states he \"wants everything done to stick around\". RN messaged Ruthy DE LOS SANTOS regarding patient's wish to be a full code. Code status changed to full code. Labs ordered for the am.

## 2025-07-30 NOTE — PROGRESS NOTES
[facility]  Office: [phone]  Kris Loyd, DO, Anup Mantilla, DO, Pratik Yeung DO, Blaze Valerio, DO, Shayna Blount MD, Eun Castellano MD, Tristian Grullon MD, Ninoska Remy MD,  Jaime Perry MD, Kendal Milligan MD, Ernie Corey MD,  Joanna Husain DO, Francis Loyd DO, Winnie Colin MD,  Luis Felipe Arnold DO, Rachel Miranda MD, Ratna Clal MD, Quinn Langston MD,  Laci Burton MD, Tani Dexter MD, Savanah Diamond MD, [place] MD, Tyler Keyes MD, Chris Layne MD, Gary Montano DO, Kira Garcia MD, Amanda Moran DO, Adelso Giles MD, Momo Heath MD, Joanna Tafoya MD, Mohsin Reza, MD, Lindsey Muñoz MD, Shirley Waterhouse, CNP,  Rita Leary, CNP, Gary Friend, CNP,  Makayla Salazar, DNP, Farnaz Torres, CNP, Lisa Delgadillo, CNP, Yareli Almanza, CNP, Sarah Bliss, CNP, Lelia Olivera, PA-C, Maile Quinonez, CNP, Adrian Davis, CNP,  Ashley Figueroa, CNP, Sera Hunt, CNP, Jeffy Dodson, PA-C, Ana Tse, PA-C, Tami Lala, CNP,        Sonam Sadler, CNS, Aditi Nance, CNP, Ana Booker, CNP         [facility]   IN-PATIENT SERVICE   [facility]    Progress Note    7/30/2025    11:56 AM    Name:   Maldonado Geiger  MRN:     [id number]     Acct:      [id number]   Room:   2023/2023-01  IP Day:  1  Admit Date:  7/29/2025 11:09 AM    PCP:   Fabi Anders MD  Code Status:  Full Code    Subjective:     C/C:   Chief Complaint   Patient presents with    Abnormal Lab     Sent to ED for CR of 5.0     Interval History Status: .   Denies any problem by himself  Creatinine trending down, 5.0-4.1-3.4 (baseline 3.0), GFR 20  Has history of Right nephrectomy due to renal cell carcinoma  CT abdomen shows mild left hydronephrosis is probably related to chronic bladder outlet obstruction   Brief History:   Maldonado Geiger is a 65 y.o. Non- / non  male who presents with Abnormal Lab (Sent to ED for CR of 5.0)   and is admitted to the  (benign prostatic hyperplasia), CKD (chronic kidney disease) stage 4, GFR 15-29 ml/min (HCC), Confusion, Head injury, Headache, Hypertension, NSTEMI (non-ST elevated myocardial infarction) (HCC), Seizure (HCC), Stroke (cerebrum) ([facility]), and TBI (traumatic brain injury) ([facility]).    Social History:   reports that he has been smoking cigarettes. He has never been exposed to tobacco smoke. He has never used smokeless tobacco. He reports current alcohol use. He reports current drug use. Drug: Marijuana (Weed).     Family History:   Family History   Problem Relation Age of Onset    Hypertension Mother     Hypertension Father        Vitals:  /76   Pulse 68   Temp 97.9 °F (36.6 °C) (Oral)   Resp 16   Ht 1.956 m (6' 5\")   Wt 66.1 kg (145 lb 11.2 oz)   SpO2 95%   BMI 17.28 kg/m²   Temp (24hrs), Av.2 °F (36.8 °C), Min:97.9 °F (36.6 °C), Max:98.6 °F (37 °C)    No results for input(s): \"POCGLU\" in the last 72 hours.    I/O (24Hr):    Intake/Output Summary (Last 24 hours) at 2025 1156  Last data filed at 2025 0837  Gross per 24 hour   Intake 1628.02 ml   Output 1350 ml   Net 278.02 ml       Labs:  Hematology:  Recent Labs     25  1145   WBC 5.0   RBC 2.91*   HGB 9.8*   HCT 29.4*   .0   MCH 33.7*   MCHC 33.3   RDW 11.9      MPV 9.7     Chemistry:  Recent Labs     25  1145 25  0549    142   K 4.2 4.0    109*   CO2 23 21   GLUCOSE 145* 92   BUN 65* 53*   CREATININE 4.1* 3.4*   ANIONGAP 12 11   LABGLOM 15* 20*   CALCIUM 9.3 8.8     Recent Labs     25  1145   AST 17   ALT 8*   ALKPHOS 82   BILITOT <0.2   BILIDIR <0.1   LIPASE 90*     ABG:  Lab Results   Component Value Date/Time    FIO2 INFORMATION NOT PROVIDED 2025 08:18 AM     No results found for: \"SPECIAL\"  No results found for: \"CULTURE\"    Radiology:  CT ABDOMEN PELVIS WO CONTRAST Additional Contrast? None  Result Date: 2025  Interval development of mild left hydronephrosis is probably related

## 2025-07-30 NOTE — CARE COORDINATION
Case Management Assessment  Initial Evaluation    Date/Time of Evaluation: 7/30/2025 5:01 PM  Assessment Completed by: Carly Gale RN    If patient is discharged prior to next notation, then this note serves as note for discharge by case management.    Patient Name: Maldonado Geiger                   YOB: 1960  Diagnosis: JARED (acute kidney injury) [N17.9]                   Date / Time: 7/29/2025 11:09 AM    Patient Admission Status: Inpatient   Readmission Risk (Low < 19, Mod (19-27), High > 27): Readmission Risk Score: 21.6    Current PCP: Fabi Anders MD  PCP verified by CM? Yes    Chart Reviewed: Yes      History Provided by: Patient  Patient Orientation: Alert and Oriented    Patient Cognition: Short Term Memory Deficit    Hospitalization in the last 30 days (Readmission):  No    If yes, Readmission Assessment in  Navigator will be completed.    Advance Directives:      Code Status: Full Code   Patient's Primary Decision Maker is: Legal Next of Kin      Discharge Planning:    Patient lives with: Spouse/Significant Other Type of Home: House  Primary Care Giver: Self  Patient Support Systems include: Spouse/Significant Other, Children   Current Financial resources: Medicare  Current community resources:    Current services prior to admission: None            Current DME:              Type of Home Care services:  None    ADLS  Prior functional level: Assistance with the following:, Shopping, Housework  Current functional level: Housework, Shopping, Assistance with the following:    PT AM-PAC:   /24  OT AM-PAC:   /24    Family can provide assistance at DC: Yes  Would you like Case Management to discuss the discharge plan with any other family members/significant others, and if so, who? No  Plans to Return to Present Housing: Yes  Other Identified Issues/Barriers to RETURNING to current housing: na   Potential Assistance needed at discharge: N/A            Potential DME:    Patient expects to

## 2025-07-31 VITALS
HEART RATE: 76 BPM | HEIGHT: 77 IN | WEIGHT: 145.7 LBS | OXYGEN SATURATION: 96 % | BODY MASS INDEX: 17.2 KG/M2 | DIASTOLIC BLOOD PRESSURE: 83 MMHG | RESPIRATION RATE: 18 BRPM | SYSTOLIC BLOOD PRESSURE: 143 MMHG | TEMPERATURE: 98.1 F

## 2025-07-31 PROCEDURE — 6360000002 HC RX W HCPCS: Performed by: INTERNAL MEDICINE

## 2025-07-31 PROCEDURE — 6370000000 HC RX 637 (ALT 250 FOR IP): Performed by: STUDENT IN AN ORGANIZED HEALTH CARE EDUCATION/TRAINING PROGRAM

## 2025-07-31 PROCEDURE — 2500000003 HC RX 250 WO HCPCS: Performed by: STUDENT IN AN ORGANIZED HEALTH CARE EDUCATION/TRAINING PROGRAM

## 2025-07-31 PROCEDURE — 99239 HOSP IP/OBS DSCHRG MGMT >30: CPT

## 2025-07-31 RX ADMIN — CARVEDILOL 37.5 MG: 25 TABLET, FILM COATED ORAL at 08:14

## 2025-07-31 RX ADMIN — ISOSORBIDE MONONITRATE 60 MG: 60 TABLET, EXTENDED RELEASE ORAL at 08:15

## 2025-07-31 RX ADMIN — FERROUS SULFATE TAB EC 325 MG (65 MG FE EQUIVALENT) 325 MG: 325 (65 FE) TABLET DELAYED RESPONSE at 08:16

## 2025-07-31 RX ADMIN — DOXAZOSIN 2 MG: 1 TABLET ORAL at 08:15

## 2025-07-31 RX ADMIN — CLONIDINE HYDROCHLORIDE 0.1 MG: 0.1 TABLET ORAL at 08:15

## 2025-07-31 RX ADMIN — SODIUM CHLORIDE, PRESERVATIVE FREE 10 ML: 5 INJECTION INTRAVENOUS at 08:20

## 2025-07-31 RX ADMIN — ASPIRIN 81 MG: 81 TABLET, DELAYED RELEASE ORAL at 08:15

## 2025-07-31 RX ADMIN — HEPARIN SODIUM 5000 UNITS: 5000 INJECTION INTRAVENOUS; SUBCUTANEOUS at 06:20

## 2025-07-31 RX ADMIN — SODIUM BICARBONATE 1300 MG: 650 TABLET ORAL at 08:15

## 2025-07-31 RX ADMIN — LAMOTRIGINE 100 MG: 100 TABLET ORAL at 08:15

## 2025-07-31 RX ADMIN — HYDRALAZINE HYDROCHLORIDE 100 MG: 50 TABLET ORAL at 06:20

## 2025-07-31 RX ADMIN — NIFEDIPINE 90 MG: 30 TABLET, FILM COATED, EXTENDED RELEASE ORAL at 08:15

## 2025-07-31 ASSESSMENT — PAIN SCALES - GENERAL: PAINLEVEL_OUTOF10: 0

## 2025-07-31 NOTE — PLAN OF CARE
Problem: Chronic Conditions and Co-morbidities  Goal: Patient's chronic conditions and co-morbidity symptoms are monitored and maintained or improved  7/30/2025 2022 by Violeta Salter RN  Outcome: Progressing  Flowsheets (Taken 7/30/2025 2022)  Care Plan - Patient's Chronic Conditions and Co-Morbidity Symptoms are Monitored and Maintained or Improved:   Monitor and assess patient's chronic conditions and comorbid symptoms for stability, deterioration, or improvement   Collaborate with multidisciplinary team to address chronic and comorbid conditions and prevent exacerbation or deterioration   Update acute care plan with appropriate goals if chronic or comorbid symptoms are exacerbated and prevent overall improvement and discharge     Problem: Safety - Adult  Goal: Free from fall injury  7/30/2025 2022 by Violeta Salter RN  Outcome: Progressing  Flowsheets (Taken 7/30/2025 2022)  Free From Fall Injury:   Instruct family/caregiver on patient safety   Based on caregiver fall risk screen, instruct family/caregiver to ask for assistance with transferring infant if caregiver noted to have fall risk factors     Problem: Pain  Goal: Verbalizes/displays adequate comfort level or baseline comfort level  7/30/2025 2022 by Violeta Salter RN  Outcome: Progressing  Flowsheets (Taken 7/30/2025 2022)  Verbalizes/displays adequate comfort level or baseline comfort level:   Encourage patient to monitor pain and request assistance   Assess pain using appropriate pain scale   Administer analgesics based on type and severity of pain and evaluate response   Implement non-pharmacological measures as appropriate and evaluate response   Consider cultural and social influences on pain and pain management   Notify Licensed Independent Practitioner if interventions unsuccessful or patient reports new pain

## 2025-07-31 NOTE — DISCHARGE SUMMARY
@[email or web address]@    [facility]   IN-PATIENT SERVICE   [facility]    Discharge Summary     Patient ID: Maldonado Geiger  :  1960   MRN: [id number]     ACCOUNT:  [id number]   Patient's PCP: Fabi Anders MD  Admit Date: 2025   Discharge Date: 2025     Length of Stay: 2  Code Status:  Full Code  Admitting Physician: Barbi Schaeffer MD  Discharge Physician: Amanda Moran DO     Active Discharge Diagnoses:     Hospital Problem Lists:  Principal Problem:    JARED (acute kidney injury)  Active Problems:    Hydronephrosis of left kidney    History of renal cell cancer    History of right nephrectomy    Essential hypertension    Seizure disorder (HCC)    Stage 4 chronic kidney disease (HCC)    BPH (benign prostatic hyperplasia)    Severe malnutrition  Resolved Problems:    * No resolved hospital problems. *       Discharged Condition: stable    Hospital Stay:     Hospital Course:  Maldonado Geiger is a 65 y.o. male who was admitted for the management of   JARED (acute kidney injury) , presented to ER with Abnormal Lab (Sent to ED for CR of 5.0)    This is a 65-year-old male with past medical history of HTN, HFrEF, seizure disorder, CKD stage IV who presented to the ED per his PCP for abnormal labs done on 2025.  His creatinine was elevated at 5, baseline is around 3.  Patient is able to make urine and follows with nephrology outpatient.  Patient was admitted for evaluation and treatment of JARED on CKD.  At the ED patient's creatinine was 4.1.  CT abdomen/pelvis showed mild left hydronephrosis.  He was evaluated by nephrology and they stated that patient's creatinine is pretty close to baseline.  He was cleared by nephrology for discharge and to follow-up outpatient in 2 to 4 weeks with a repeat BMP in 5 days.  Patient was also evaluated by urology, patient refused a cystoscopy and an indwelling Hansen.    Significant therapeutic interventions: As above    Significant  Standing Exp. Date: 07/31/26   Order Comments: Fax results to Dr. Jaeger     Basic Metabolic Panel   Standing Status: Future Standing Exp. Date: 07/31/26       Time Spent on discharge is  40 mins in patient examination, evaluation, counseling as well as medication reconciliation, prescriptions for required medications, discharge plan and follow up.    Electronically signed by   Amanda Moran DO  7/31/2025  11:33 AM      Thank you Fabi Lawson MD for the opportunity to be involved in this patient's care.

## 2025-07-31 NOTE — PROGRESS NOTES
This writer went over AVS answered all questions at this time.  Patient verbalized understanding of all follow up appointments. Patient leaving with all personal belongings via wheelchair to private transportation. Patient stable at time of discharge.

## 2025-07-31 NOTE — PROGRESS NOTES
Patient was seen and examined.  Patient was lying flat.  He was alert and awake.  Patient refused cystoscopy.  There is no BMP from this morning  Recommendations:  1.  BMP 5 days postdischarge  2.  Patient has a follow-up appointment with Dr. Workman  3.  Okay to discharge from renal standpoint    Rigo Daugherty MD

## 2025-07-31 NOTE — DISCHARGE INSTRUCTIONS
Continue taking medications as prescribed  Follow-up with your PCP within 1 week of discharge  Follow-up with nephrology in 2-4 weeks and need a repeat BMP lab in 5 days.

## 2025-07-31 NOTE — PROGRESS NOTES
RN messaged [place] NP regarding pt's bp 106/55 HR 75 and patient scheduled to take Clonidine and hydralazine this evening. NP states to hold both.

## 2025-07-31 NOTE — CONSULTS
Rivera Guerrero MD  Urology Consultation    Patient:  Maldonado Geiger  MRN: [id number]  YOB: 1960    CHIEF COMPLAINT: Hydronephrosis on CT imaging, acute kidney injury    HISTORY OF PRESENT ILLNESS:   The patient is a 65 y.o. male who presents with abnormal labs, acute kidney injury,  Patient with history of renal cancer, status post right nephrectomy in 1998  The patient had CT imaging in June, as well as Doppler ultrasound of the left kidney there were unremarkable the left renal artery was found to be patent and not stenosed with no renal atrophy    Repeat CT imaging was performed during this admission  Patient's old records, notes and chart reviewed and summarized above.    Past Medical History:    Past Medical History:   Diagnosis Date    BPH (benign prostatic hyperplasia)     CKD (chronic kidney disease) stage 4, GFR 15-29 ml/min (HCC)     Confusion     Head injury     Headache     Hypertension     NSTEMI (non-ST elevated myocardial infarction) (HCC)     Seizure (HCC)     Stroke (cerebrum) (HCC)     TBI (traumatic brain injury) (HCC)        Past Surgical History:    Past Surgical History:   Procedure Laterality Date    CT CRYOABLATION OF RENAL TUMOR  10/02/2018    CT CRYOABLATION OF RENAL TUMOR    KIDNEY REMOVAL       Previous  surgery: Nephrectomy    Medications:    Scheduled Meds:   heparin (porcine)  5,000 Units SubCUTAneous 3 times per day    aspirin EC  81 mg Oral Daily    atorvastatin  40 mg Oral Nightly    carvedilol  37.5 mg Oral BID WC    cloNIDine  0.1 mg Oral 4x Daily    doxazosin  2 mg Oral Daily    ferrous sulfate  325 mg Oral Daily with breakfast    hydrALAZINE  100 mg Oral 3 times per day    isosorbide mononitrate  60 mg Oral Daily    lamoTRIgine  100 mg Oral BID    NIFEdipine  90 mg Oral Daily    sodium bicarbonate  1,300 mg Oral 4x Daily    sodium chloride flush  5-40 mL IntraVENous 2 times per day     Continuous Infusions:   sodium chloride       PRN Meds:.sodium chloride

## 2025-07-31 NOTE — PLAN OF CARE
Problem: Chronic Conditions and Co-morbidities  Goal: Patient's chronic conditions and co-morbidity symptoms are monitored and maintained or improved  Outcome: Adequate for Discharge     Problem: Discharge Planning  Goal: Discharge to home or other facility with appropriate resources  Outcome: Adequate for Discharge     Problem: Safety - Adult  Goal: Free from fall injury  Outcome: Adequate for Discharge     Problem: Pain  Goal: Verbalizes/displays adequate comfort level or baseline comfort level  Outcome: Adequate for Discharge  Flowsheets (Taken 7/31/2025 0800)  Verbalizes/displays adequate comfort level or baseline comfort level: Encourage patient to monitor pain and request assistance     Problem: ABCDS Injury Assessment  Goal: Absence of physical injury  Outcome: Adequate for Discharge     Problem: Nutrition Deficit:  Goal: Optimize nutritional status  Outcome: Adequate for Discharge
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
## 2025-08-10 ENCOUNTER — TELEPHONE (OUTPATIENT)
Dept: NEUROLOGY | Age: 65
End: 2025-08-10

## 2025-08-11 NOTE — ED PROVIDER NOTES
eMERGENCY dEPARTMENT eNCOUnter   Independent Attestation     Pt Name: Maldonado Geiger  MRN: [id number]  Birthdate 1960  Date of evaluation: 8/10/25     Maldonado Geiger is a 65 y.o. male with CC: Abnormal Lab (Sent to ED for CR of 5.0)      Based on the medical record the care appears appropriate.  I was personally available for consultation however this patient's care was not discussed with me during their ER visit.     Ricco Regan MD  Attending Emergency Physician          Ricco Regan MD  08/10/25 2007

## 2025-08-11 NOTE — PROGRESS NOTES
Physician Progress Note      PATIENT:               MARCELL TORRES  CSN #:                  [id number]  :                       1960  ADMIT DATE:       2025 11:09 AM  DISCH DATE:        2025 11:55 AM  RESPONDING  PROVIDER #:        Amanda Moran DO          QUERY TEXT:    Please clarify the patient?s nutritional status:    The clinical indicators include:  65 yr old, Essential hypertension    \" Malnutrition Status:  Severe malnutrition (25 1555)  Findings of the 6 clinical characteristics of malnutrition:  Energy Intake:  75% or less estimated energy requirements for 1 month or   longer  Weight Loss:  Greater than 20% over 1 year  Body Fat Loss:  Severe body fat loss Fat Overlying Ribs  Muscle Mass Loss:  Unable to assess  Fluid Accumulation:  No fluid accumulation Extremities   Strength:  Not Performed  Current BMI (kg/m2): 17.3\" Dietitian Progress Notes by Aditi Barriga, RD,   LD at 2025      Dietitian consult, ADULT DIET; Regular; 3 carb choices, Start Glucerna 3x/day,   Monitor p.o intakes and labs    Thank you,  DOE Mckeon CDS  Options provided:  -- Protein calorie malnutrition severe  -- Other - I will add my own diagnosis  -- Disagree - Not applicable / Not valid  -- Disagree - Clinically unable to determine / Unknown  -- Refer to Clinical Documentation Reviewer    PROVIDER RESPONSE TEXT:    This patient has severe protein calorie malnutrition.    Query created by: Tona Hung on 2025 6:05 AM      Electronically signed by:  Amanda Mroan DO 2025 2:24 PM

## 2025-08-12 RX ORDER — LAMOTRIGINE 100 MG/1
TABLET ORAL
Qty: 180 TABLET | Refills: 1 | Status: SHIPPED | OUTPATIENT
Start: 2025-08-12

## 2025-08-14 ENCOUNTER — APPOINTMENT (OUTPATIENT)
Dept: CT IMAGING | Age: 65
End: 2025-08-14
Payer: MEDICARE

## 2025-08-14 ENCOUNTER — HOSPITAL ENCOUNTER (EMERGENCY)
Age: 65
Discharge: HOME | End: 2025-08-14
Attending: EMERGENCY MEDICINE
Payer: MEDICARE

## 2025-08-14 VITALS
DIASTOLIC BLOOD PRESSURE: 96 MMHG | SYSTOLIC BLOOD PRESSURE: 185 MMHG | BODY MASS INDEX: 17.12 KG/M2 | WEIGHT: 145 LBS | TEMPERATURE: 97.7 F | HEIGHT: 77 IN | HEART RATE: 74 BPM | RESPIRATION RATE: 18 BRPM | OXYGEN SATURATION: 97 %

## 2025-08-14 DIAGNOSIS — R10.9 FLANK PAIN: Primary | ICD-10-CM

## 2025-08-14 DIAGNOSIS — I10 ESSENTIAL HYPERTENSION: ICD-10-CM

## 2025-08-14 DIAGNOSIS — N18.9 CHRONIC KIDNEY DISEASE, UNSPECIFIED CKD STAGE: ICD-10-CM

## 2025-08-14 LAB
ANION GAP SERPL CALCULATED.3IONS-SCNC: 11 MMOL/L (ref 9–16)
BASOPHILS # BLD: 0.05 K/UL (ref 0–0.2)
BASOPHILS NFR BLD: 1 % (ref 0–2)
BILIRUB UR QL STRIP: NEGATIVE
BUN SERPL-MCNC: 28 MG/DL (ref 8–23)
CALCIUM SERPL-MCNC: 9 MG/DL (ref 8.8–10.2)
CHLORIDE SERPL-SCNC: 108 MMOL/L (ref 98–107)
CLARITY UR: CLEAR
CO2 SERPL-SCNC: 22 MMOL/L (ref 20–31)
COLOR UR: YELLOW
CREAT SERPL-MCNC: 3.6 MG/DL (ref 0.7–1.2)
EOSINOPHIL # BLD: 0.13 K/UL (ref 0–0.44)
EOSINOPHILS RELATIVE PERCENT: 3 % (ref 1–4)
EPI CELLS #/AREA URNS HPF: NORMAL /HPF (ref 0–5)
ERYTHROCYTE [DISTWIDTH] IN BLOOD BY AUTOMATED COUNT: 11.9 % (ref 11.8–14.4)
GFR, ESTIMATED: 18 ML/MIN/1.73M2
GLUCOSE SERPL-MCNC: 98 MG/DL (ref 82–115)
GLUCOSE UR STRIP-MCNC: NEGATIVE MG/DL
HCT VFR BLD AUTO: 32.7 % (ref 40.7–50.3)
HGB BLD-MCNC: 11 G/DL (ref 13–17)
HGB UR QL STRIP.AUTO: NEGATIVE
IMM GRANULOCYTES # BLD AUTO: 0.01 K/UL (ref 0–0.3)
IMM GRANULOCYTES NFR BLD: 0 %
KETONES UR STRIP-MCNC: NEGATIVE MG/DL
LEUKOCYTE ESTERASE UR QL STRIP: NEGATIVE
LYMPHOCYTES NFR BLD: 1.37 K/UL (ref 1.1–3.7)
LYMPHOCYTES RELATIVE PERCENT: 27 % (ref 24–43)
MCH RBC QN AUTO: 34 PG (ref 25.2–33.5)
MCHC RBC AUTO-ENTMCNC: 33.6 G/DL (ref 28.4–34.8)
MCV RBC AUTO: 100.9 FL (ref 82.6–102.9)
MONOCYTES NFR BLD: 0.5 K/UL (ref 0.1–1.2)
MONOCYTES NFR BLD: 10 % (ref 3–12)
NEUTROPHILS NFR BLD: 59 % (ref 36–65)
NEUTS SEG NFR BLD: 2.97 K/UL (ref 1.5–8.1)
NITRITE UR QL STRIP: NEGATIVE
NRBC BLD-RTO: 0 PER 100 WBC
PH UR STRIP: 8 [PH] (ref 5–8)
PLATELET # BLD AUTO: 251 K/UL (ref 138–453)
PMV BLD AUTO: 10.2 FL (ref 8.1–13.5)
POTASSIUM SERPL-SCNC: 4.2 MMOL/L (ref 3.7–5.3)
PROT UR STRIP-MCNC: ABNORMAL MG/DL
RBC # BLD AUTO: 3.24 M/UL (ref 4.21–5.77)
RBC #/AREA URNS HPF: NORMAL /HPF (ref 0–2)
SODIUM SERPL-SCNC: 141 MMOL/L (ref 136–145)
SP GR UR STRIP: 1.01 (ref 1–1.03)
UROBILINOGEN UR STRIP-ACNC: NORMAL EU/DL (ref 0–1)
WBC #/AREA URNS HPF: NORMAL /HPF (ref 0–5)
WBC OTHER # BLD: 5 K/UL (ref 3.5–11.3)

## 2025-08-14 PROCEDURE — 74176 CT ABD & PELVIS W/O CONTRAST: CPT

## 2025-08-14 PROCEDURE — 6370000000 HC RX 637 (ALT 250 FOR IP): Performed by: PHYSICIAN ASSISTANT

## 2025-08-14 PROCEDURE — 99284 EMERGENCY DEPT VISIT MOD MDM: CPT

## 2025-08-14 PROCEDURE — 2580000003 HC RX 258: Performed by: PHYSICIAN ASSISTANT

## 2025-08-14 PROCEDURE — 96375 TX/PRO/DX INJ NEW DRUG ADDON: CPT

## 2025-08-14 PROCEDURE — 51798 US URINE CAPACITY MEASURE: CPT

## 2025-08-14 PROCEDURE — 81001 URINALYSIS AUTO W/SCOPE: CPT

## 2025-08-14 PROCEDURE — 85025 COMPLETE CBC W/AUTO DIFF WBC: CPT

## 2025-08-14 PROCEDURE — 6360000002 HC RX W HCPCS: Performed by: PHYSICIAN ASSISTANT

## 2025-08-14 PROCEDURE — 96374 THER/PROPH/DIAG INJ IV PUSH: CPT

## 2025-08-14 PROCEDURE — 80048 BASIC METABOLIC PNL TOTAL CA: CPT

## 2025-08-14 PROCEDURE — 96376 TX/PRO/DX INJ SAME DRUG ADON: CPT

## 2025-08-14 RX ORDER — ONDANSETRON 2 MG/ML
4 INJECTION INTRAMUSCULAR; INTRAVENOUS ONCE
Status: COMPLETED | OUTPATIENT
Start: 2025-08-14 | End: 2025-08-14

## 2025-08-14 RX ORDER — HYDRALAZINE HYDROCHLORIDE 20 MG/ML
15 INJECTION INTRAMUSCULAR; INTRAVENOUS ONCE
Status: COMPLETED | OUTPATIENT
Start: 2025-08-14 | End: 2025-08-14

## 2025-08-14 RX ORDER — CLONIDINE HYDROCHLORIDE 0.1 MG/1
0.2 TABLET ORAL ONCE
Status: COMPLETED | OUTPATIENT
Start: 2025-08-14 | End: 2025-08-14

## 2025-08-14 RX ORDER — HYDROCODONE BITARTRATE AND ACETAMINOPHEN 5; 325 MG/1; MG/1
1-2 TABLET ORAL EVERY 8 HOURS PRN
Qty: 12 TABLET | Refills: 0 | Status: SHIPPED | OUTPATIENT
Start: 2025-08-14 | End: 2025-08-19

## 2025-08-14 RX ORDER — HYDRALAZINE HYDROCHLORIDE 25 MG/1
100 TABLET, FILM COATED ORAL ONCE
Status: COMPLETED | OUTPATIENT
Start: 2025-08-14 | End: 2025-08-14

## 2025-08-14 RX ORDER — MORPHINE SULFATE 4 MG/ML
4 INJECTION, SOLUTION INTRAMUSCULAR; INTRAVENOUS ONCE
Refills: 0 | Status: COMPLETED | OUTPATIENT
Start: 2025-08-14 | End: 2025-08-14

## 2025-08-14 RX ORDER — LABETALOL HYDROCHLORIDE 5 MG/ML
20 INJECTION, SOLUTION INTRAVENOUS ONCE
Status: COMPLETED | OUTPATIENT
Start: 2025-08-14 | End: 2025-08-14

## 2025-08-14 RX ORDER — LABETALOL HYDROCHLORIDE 5 MG/ML
40 INJECTION, SOLUTION INTRAVENOUS ONCE
Status: COMPLETED | OUTPATIENT
Start: 2025-08-14 | End: 2025-08-14

## 2025-08-14 RX ORDER — 0.9 % SODIUM CHLORIDE 0.9 %
1000 INTRAVENOUS SOLUTION INTRAVENOUS ONCE
Status: COMPLETED | OUTPATIENT
Start: 2025-08-14 | End: 2025-08-14

## 2025-08-14 RX ADMIN — CLONIDINE HYDROCHLORIDE 0.2 MG: 0.1 TABLET ORAL at 14:50

## 2025-08-14 RX ADMIN — CLONIDINE HYDROCHLORIDE 0.2 MG: 0.1 TABLET ORAL at 17:04

## 2025-08-14 RX ADMIN — HYDRALAZINE HYDROCHLORIDE 100 MG: 25 TABLET ORAL at 16:05

## 2025-08-14 RX ADMIN — MORPHINE SULFATE 4 MG: 4 INJECTION, SOLUTION INTRAMUSCULAR; INTRAVENOUS at 10:18

## 2025-08-14 RX ADMIN — LABETALOL HYDROCHLORIDE 20 MG: 5 INJECTION INTRAVENOUS at 16:06

## 2025-08-14 RX ADMIN — ONDANSETRON 4 MG: 2 INJECTION, SOLUTION INTRAMUSCULAR; INTRAVENOUS at 10:17

## 2025-08-14 RX ADMIN — HYDRALAZINE HYDROCHLORIDE 15 MG: 20 INJECTION, SOLUTION INTRAMUSCULAR; INTRAVENOUS at 13:53

## 2025-08-14 RX ADMIN — LABETALOL HYDROCHLORIDE 40 MG: 5 INJECTION INTRAVENOUS at 17:04

## 2025-08-14 RX ADMIN — SODIUM CHLORIDE 1000 ML: 0.9 INJECTION, SOLUTION INTRAVENOUS at 10:15

## 2025-08-14 ASSESSMENT — PAIN SCALES - GENERAL
PAINLEVEL_OUTOF10: 10
PAINLEVEL_OUTOF10: 3
PAINLEVEL_OUTOF10: 10
PAINLEVEL_OUTOF10: 3
PAINLEVEL_OUTOF10: 3

## 2025-08-14 ASSESSMENT — PAIN DESCRIPTION - ORIENTATION
ORIENTATION: LEFT
ORIENTATION: LEFT

## 2025-08-14 ASSESSMENT — PAIN - FUNCTIONAL ASSESSMENT: PAIN_FUNCTIONAL_ASSESSMENT: 0-10

## 2025-08-14 ASSESSMENT — PAIN DESCRIPTION - PAIN TYPE: TYPE: ACUTE PAIN

## 2025-08-14 ASSESSMENT — PAIN DESCRIPTION - DESCRIPTORS
DESCRIPTORS: ACHING;CRUSHING
DESCRIPTORS: ACHING;CRUSHING;DISCOMFORT

## 2025-08-14 ASSESSMENT — PAIN DESCRIPTION - LOCATION
LOCATION: FLANK;BACK
LOCATION: BACK

## 2025-08-14 ASSESSMENT — PAIN DESCRIPTION - FREQUENCY: FREQUENCY: CONTINUOUS
